# Patient Record
Sex: MALE | Race: WHITE
[De-identification: names, ages, dates, MRNs, and addresses within clinical notes are randomized per-mention and may not be internally consistent; named-entity substitution may affect disease eponyms.]

---

## 2017-01-08 ENCOUNTER — HOSPITAL ENCOUNTER (EMERGENCY)
Age: 72
Discharge: HOME | End: 2017-01-08
Payer: MEDICARE

## 2017-01-08 ENCOUNTER — HOSPITAL ENCOUNTER (OUTPATIENT)
Age: 72
Discharge: ANOTHER HOSPITAL | End: 2017-01-08
Payer: MEDICARE

## 2017-01-08 DIAGNOSIS — F17.200: ICD-10-CM

## 2017-01-08 DIAGNOSIS — Z66: ICD-10-CM

## 2017-01-08 DIAGNOSIS — I10: ICD-10-CM

## 2017-01-08 DIAGNOSIS — M25.571: Primary | ICD-10-CM

## 2017-01-08 DIAGNOSIS — Z79.82: ICD-10-CM

## 2017-01-08 DIAGNOSIS — E11.9: ICD-10-CM

## 2017-01-08 DIAGNOSIS — M1A.0711: ICD-10-CM

## 2017-01-08 PROCEDURE — 93971 EXTREMITY STUDY: CPT

## 2017-01-08 PROCEDURE — 99283 EMERGENCY DEPT VISIT LOW MDM: CPT

## 2017-01-08 PROCEDURE — 73630 X-RAY EXAM OF FOOT: CPT

## 2017-01-08 PROCEDURE — 73610 X-RAY EXAM OF ANKLE: CPT

## 2017-01-13 ENCOUNTER — HOSPITAL ENCOUNTER (INPATIENT)
Age: 72
LOS: 2 days | Discharge: ANOTHER HOSPITAL | DRG: 812 | End: 2017-01-15
Payer: MEDICARE

## 2017-01-13 ENCOUNTER — HOSPITAL ENCOUNTER (OUTPATIENT)
Age: 72
Discharge: ANOTHER HOSPITAL | End: 2017-01-13
Payer: MEDICARE

## 2017-01-13 DIAGNOSIS — Z79.82: ICD-10-CM

## 2017-01-13 DIAGNOSIS — I10: ICD-10-CM

## 2017-01-13 DIAGNOSIS — I50.9: ICD-10-CM

## 2017-01-13 DIAGNOSIS — E78.5: ICD-10-CM

## 2017-01-13 DIAGNOSIS — F17.210: ICD-10-CM

## 2017-01-13 DIAGNOSIS — D50.0: Primary | ICD-10-CM

## 2017-01-13 DIAGNOSIS — Z66: ICD-10-CM

## 2017-01-13 DIAGNOSIS — L97.919: ICD-10-CM

## 2017-01-13 DIAGNOSIS — I83.019: ICD-10-CM

## 2017-01-13 DIAGNOSIS — E66.01: ICD-10-CM

## 2017-01-13 DIAGNOSIS — I11.0: ICD-10-CM

## 2017-01-13 DIAGNOSIS — K92.2: ICD-10-CM

## 2017-01-13 DIAGNOSIS — E87.1: ICD-10-CM

## 2017-01-13 DIAGNOSIS — I77.9: ICD-10-CM

## 2017-01-13 DIAGNOSIS — J44.9: ICD-10-CM

## 2017-01-13 DIAGNOSIS — N17.9: ICD-10-CM

## 2017-01-13 DIAGNOSIS — I87.2: ICD-10-CM

## 2017-01-13 DIAGNOSIS — E11.9: ICD-10-CM

## 2017-01-13 DIAGNOSIS — M10.9: ICD-10-CM

## 2017-01-13 DIAGNOSIS — F32.9: ICD-10-CM

## 2017-01-13 PROCEDURE — 30233N1 TRANSFUSION OF NONAUTOLOGOUS RED BLOOD CELLS INTO PERIPHERAL VEIN, PERCUTANEOUS APPROACH: ICD-10-PCS | Performed by: NURSE PRACTITIONER

## 2017-01-14 PROCEDURE — 30233N1 TRANSFUSION OF NONAUTOLOGOUS RED BLOOD CELLS INTO PERIPHERAL VEIN, PERCUTANEOUS APPROACH: ICD-10-PCS | Performed by: NURSE PRACTITIONER

## 2017-01-22 ENCOUNTER — HOSPITAL ENCOUNTER (OUTPATIENT)
Age: 72
Discharge: ANOTHER HOSPITAL | End: 2017-01-22
Payer: MEDICARE

## 2021-04-14 ENCOUNTER — HOSPITAL ENCOUNTER (OUTPATIENT)
Dept: HOSPITAL 76 - LAB.R | Age: 76
Discharge: HOME | End: 2021-04-14
Attending: SKILLED NURSING FACILITY
Payer: MEDICARE

## 2021-04-14 DIAGNOSIS — F32.9: ICD-10-CM

## 2021-04-14 DIAGNOSIS — D50.8: ICD-10-CM

## 2021-04-14 DIAGNOSIS — I10: Primary | ICD-10-CM

## 2021-04-14 DIAGNOSIS — E78.49: ICD-10-CM

## 2021-04-14 LAB
ANION GAP SERPL CALCULATED.4IONS-SCNC: 10 MMOL/L (ref 6–13)
BASOPHILS NFR BLD AUTO: 0.1 10^3/UL (ref 0–0.1)
BASOPHILS NFR BLD AUTO: 0.9 %
BUN SERPL-MCNC: 14 MG/DL (ref 6–20)
CALCIUM UR-MCNC: 9.7 MG/DL (ref 8.5–10.3)
CHLORIDE SERPL-SCNC: 104 MMOL/L (ref 101–111)
CO2 SERPL-SCNC: 29 MMOL/L (ref 21–32)
CREAT SERPLBLD-SCNC: 0.7 MG/DL (ref 0.6–1.2)
EOSINOPHIL # BLD AUTO: 0.3 10^3/UL (ref 0–0.7)
EOSINOPHIL NFR BLD AUTO: 6 %
ERYTHROCYTE [DISTWIDTH] IN BLOOD BY AUTOMATED COUNT: 13.9 % (ref 12–15)
GFRSERPLBLD MDRD-ARVRAT: 110 ML/MIN/{1.73_M2} (ref 89–?)
GLUCOSE SERPL-MCNC: 101 MG/DL (ref 70–100)
HCT VFR BLD AUTO: 46.6 % (ref 42–52)
HGB UR QL STRIP: 15 G/DL (ref 14–18)
LYMPHOCYTES # SPEC AUTO: 1.7 10^3/UL (ref 1.5–3.5)
LYMPHOCYTES NFR BLD AUTO: 31.8 %
MCH RBC QN AUTO: 30.7 PG (ref 27–31)
MCHC RBC AUTO-ENTMCNC: 32.2 G/DL (ref 32–36)
MCV RBC AUTO: 95.5 FL (ref 80–94)
MONOCYTES # BLD AUTO: 0.4 10^3/UL (ref 0–1)
MONOCYTES NFR BLD AUTO: 8 %
NEUTROPHILS # BLD AUTO: 2.8 10^3/UL (ref 1.5–6.6)
NEUTROPHILS # SNV AUTO: 5.4 X10^3/UL (ref 4.8–10.8)
NEUTROPHILS NFR BLD AUTO: 53.1 %
NRBC # BLD AUTO: 0 /100WBC
NRBC # BLD AUTO: 0 X10^3/UL
PDW BLD AUTO: 12.2 FL (ref 7.4–11.4)
PLATELET # BLD: 251 10^3/UL (ref 130–450)
POTASSIUM SERPL-SCNC: 3.3 MMOL/L (ref 3.5–5)
RBC MAR: 4.88 10^6/UL (ref 4.7–6.1)
SODIUM SERPLBLD-SCNC: 143 MMOL/L (ref 135–145)

## 2021-04-14 PROCEDURE — 80048 BASIC METABOLIC PNL TOTAL CA: CPT

## 2021-04-14 PROCEDURE — 85025 COMPLETE CBC W/AUTO DIFF WBC: CPT

## 2021-05-21 ENCOUNTER — HOSPITAL ENCOUNTER (OUTPATIENT)
Dept: HOSPITAL 76 - LAB.R | Age: 76
Discharge: HOME | End: 2021-05-21
Attending: FAMILY MEDICINE
Payer: MEDICARE

## 2021-05-21 DIAGNOSIS — E87.6: ICD-10-CM

## 2021-05-21 DIAGNOSIS — D50.8: ICD-10-CM

## 2021-05-21 DIAGNOSIS — E78.49: Primary | ICD-10-CM

## 2021-05-21 LAB
ANION GAP SERPL CALCULATED.4IONS-SCNC: 10 MMOL/L (ref 6–13)
BUN SERPL-MCNC: 23 MG/DL (ref 6–20)
CALCIUM UR-MCNC: 9.2 MG/DL (ref 8.5–10.3)
CHLORIDE SERPL-SCNC: 101 MMOL/L (ref 101–111)
CO2 SERPL-SCNC: 31 MMOL/L (ref 21–32)
CREAT SERPLBLD-SCNC: 0.7 MG/DL (ref 0.6–1.2)
GFRSERPLBLD MDRD-ARVRAT: 110 ML/MIN/{1.73_M2} (ref 89–?)
GLUCOSE SERPL-MCNC: 106 MG/DL (ref 70–100)
POTASSIUM SERPL-SCNC: 3.5 MMOL/L (ref 3.5–5)
SODIUM SERPLBLD-SCNC: 142 MMOL/L (ref 135–145)

## 2021-05-21 PROCEDURE — 80048 BASIC METABOLIC PNL TOTAL CA: CPT

## 2021-09-23 ENCOUNTER — HOSPITAL ENCOUNTER (OUTPATIENT)
Dept: HOSPITAL 76 - LAB.R | Age: 76
End: 2021-09-23
Attending: FAMILY MEDICINE
Payer: MEDICARE

## 2021-09-23 DIAGNOSIS — R56.9: ICD-10-CM

## 2021-09-23 DIAGNOSIS — E78.49: ICD-10-CM

## 2021-09-23 DIAGNOSIS — D50.8: ICD-10-CM

## 2021-09-23 DIAGNOSIS — I10: ICD-10-CM

## 2021-09-23 DIAGNOSIS — E87.6: Primary | ICD-10-CM

## 2021-09-23 DIAGNOSIS — N12: ICD-10-CM

## 2021-09-23 LAB
ALBUMIN DIAFP-MCNC: 3.9 G/DL (ref 3.2–5.5)
ALBUMIN/GLOB SERPL: 1.4 {RATIO} (ref 1–2.2)
ALP SERPL-CCNC: 52 IU/L (ref 42–121)
ALT SERPL W P-5'-P-CCNC: 15 IU/L (ref 10–60)
ANION GAP SERPL CALCULATED.4IONS-SCNC: 7 MMOL/L (ref 6–13)
AST SERPL W P-5'-P-CCNC: 11 IU/L (ref 10–42)
BASOPHILS NFR BLD AUTO: 0.1 10^3/UL (ref 0–0.1)
BASOPHILS NFR BLD AUTO: 1.1 %
BILIRUB BLD-MCNC: 0.4 MG/DL (ref 0.2–1)
BUN SERPL-MCNC: 18 MG/DL (ref 6–20)
CALCIUM UR-MCNC: 8.6 MG/DL (ref 8.5–10.3)
CHLORIDE SERPL-SCNC: 102 MMOL/L (ref 101–111)
CLARITY UR REFRACT.AUTO: CLEAR
CO2 SERPL-SCNC: 29 MMOL/L (ref 21–32)
CREAT SERPLBLD-SCNC: 0.8 MG/DL (ref 0.6–1.2)
EOSINOPHIL # BLD AUTO: 0.4 10^3/UL (ref 0–0.7)
EOSINOPHIL NFR BLD AUTO: 7.8 %
ERYTHROCYTE [DISTWIDTH] IN BLOOD BY AUTOMATED COUNT: 13.5 % (ref 12–15)
GFRSERPLBLD MDRD-ARVRAT: 94 ML/MIN/{1.73_M2} (ref 89–?)
GLOBULIN SER-MCNC: 2.7 G/DL (ref 2.1–4.2)
GLUCOSE SERPL-MCNC: 119 MG/DL (ref 70–100)
GLUCOSE UR QL STRIP.AUTO: NEGATIVE MG/DL
HCT VFR BLD AUTO: 43.5 % (ref 42–52)
HGB UR QL STRIP: 13.8 G/DL (ref 14–18)
KETONES UR QL STRIP.AUTO: NEGATIVE MG/DL
LYMPHOCYTES # SPEC AUTO: 1.6 10^3/UL (ref 1.5–3.5)
LYMPHOCYTES NFR BLD AUTO: 28.9 %
MCH RBC QN AUTO: 30.1 PG (ref 27–31)
MCHC RBC AUTO-ENTMCNC: 31.7 G/DL (ref 32–36)
MCV RBC AUTO: 94.8 FL (ref 80–94)
MONOCYTES # BLD AUTO: 0.5 10^3/UL (ref 0–1)
MONOCYTES NFR BLD AUTO: 8.3 %
NEUTROPHILS # BLD AUTO: 3 10^3/UL (ref 1.5–6.6)
NEUTROPHILS # SNV AUTO: 5.6 X10^3/UL (ref 4.8–10.8)
NEUTROPHILS NFR BLD AUTO: 53.7 %
NITRITE UR QL STRIP.AUTO: NEGATIVE
NRBC # BLD AUTO: 0 /100WBC
NRBC # BLD AUTO: 0 X10^3/UL
PDW BLD AUTO: 11.8 FL (ref 7.4–11.4)
PH UR STRIP.AUTO: 5 PH (ref 5–7.5)
PLATELET # BLD: 223 10^3/UL (ref 130–450)
POTASSIUM SERPL-SCNC: 3.7 MMOL/L (ref 3.5–5)
PROT SPEC-MCNC: 6.6 G/DL (ref 6.7–8.2)
PROT UR STRIP.AUTO-MCNC: NEGATIVE MG/DL
RBC # UR STRIP.AUTO: NEGATIVE /UL
RBC MAR: 4.59 10^6/UL (ref 4.7–6.1)
SODIUM SERPLBLD-SCNC: 138 MMOL/L (ref 135–145)
SP GR UR STRIP.AUTO: 1.02 (ref 1–1.03)
UROBILINOGEN UR QL STRIP.AUTO: (no result) E.U./DL
UROBILINOGEN UR STRIP.AUTO-MCNC: NEGATIVE MG/DL

## 2021-09-23 PROCEDURE — 87086 URINE CULTURE/COLONY COUNT: CPT

## 2021-09-23 PROCEDURE — 85025 COMPLETE CBC W/AUTO DIFF WBC: CPT

## 2021-09-23 PROCEDURE — 81003 URINALYSIS AUTO W/O SCOPE: CPT

## 2021-09-23 PROCEDURE — 80053 COMPREHEN METABOLIC PANEL: CPT

## 2021-09-23 PROCEDURE — 81001 URINALYSIS AUTO W/SCOPE: CPT

## 2022-07-16 ENCOUNTER — HOSPITAL ENCOUNTER (OUTPATIENT)
Dept: HOSPITAL 76 - EMS | Age: 77
Discharge: ANOTHER HOSPITAL | End: 2022-07-16
Payer: MEDICARE

## 2022-07-16 ENCOUNTER — HOSPITAL ENCOUNTER (INPATIENT)
Dept: HOSPITAL 76 - ED | Age: 77
LOS: 2 days | Discharge: NURSING, REHAB OR LONG-TERM CARE | DRG: 69 | End: 2022-07-18
Attending: INTERNAL MEDICINE | Admitting: INTERNAL MEDICINE
Payer: MEDICARE

## 2022-07-16 DIAGNOSIS — Z89.512: ICD-10-CM

## 2022-07-16 DIAGNOSIS — G45.9: ICD-10-CM

## 2022-07-16 DIAGNOSIS — R53.1: ICD-10-CM

## 2022-07-16 DIAGNOSIS — Z89.511: ICD-10-CM

## 2022-07-16 DIAGNOSIS — R40.4: ICD-10-CM

## 2022-07-16 DIAGNOSIS — Z74.01: ICD-10-CM

## 2022-07-16 DIAGNOSIS — Z88.8: ICD-10-CM

## 2022-07-16 DIAGNOSIS — E78.1: ICD-10-CM

## 2022-07-16 DIAGNOSIS — Z66: ICD-10-CM

## 2022-07-16 DIAGNOSIS — G83.24: ICD-10-CM

## 2022-07-16 DIAGNOSIS — I69.351: ICD-10-CM

## 2022-07-16 DIAGNOSIS — E11.40: ICD-10-CM

## 2022-07-16 DIAGNOSIS — I10: ICD-10-CM

## 2022-07-16 DIAGNOSIS — R29.810: Primary | ICD-10-CM

## 2022-07-16 DIAGNOSIS — F32.A: ICD-10-CM

## 2022-07-16 DIAGNOSIS — Z79.82: ICD-10-CM

## 2022-07-16 DIAGNOSIS — E78.5: ICD-10-CM

## 2022-07-16 DIAGNOSIS — E11.9: ICD-10-CM

## 2022-07-16 DIAGNOSIS — Z79.899: ICD-10-CM

## 2022-07-16 DIAGNOSIS — I44.7: ICD-10-CM

## 2022-07-16 DIAGNOSIS — R29.810: ICD-10-CM

## 2022-07-16 DIAGNOSIS — F17.200: ICD-10-CM

## 2022-07-16 DIAGNOSIS — R41.82: Primary | ICD-10-CM

## 2022-07-16 DIAGNOSIS — E66.01: ICD-10-CM

## 2022-07-16 DIAGNOSIS — Z20.822: ICD-10-CM

## 2022-07-16 DIAGNOSIS — E78.00: ICD-10-CM

## 2022-07-16 LAB
ALBUMIN DIAFP-MCNC: 3.7 G/DL (ref 3.2–5.5)
ALBUMIN/GLOB SERPL: 1 {RATIO} (ref 1–2.2)
ALP SERPL-CCNC: 63 IU/L (ref 42–121)
ALT SERPL W P-5'-P-CCNC: 21 IU/L (ref 10–60)
ANION GAP SERPL CALCULATED.4IONS-SCNC: 14 MMOL/L (ref 6–13)
AST SERPL W P-5'-P-CCNC: 15 IU/L (ref 10–42)
BASOPHILS NFR BLD AUTO: 0.1 10^3/UL (ref 0–0.1)
BASOPHILS NFR BLD AUTO: 0.6 %
BILIRUB BLD-MCNC: 0.6 MG/DL (ref 0.2–1)
BUN SERPL-MCNC: 51 MG/DL (ref 6–20)
CALCIUM UR-MCNC: 9.8 MG/DL (ref 8.5–10.3)
CHLORIDE SERPL-SCNC: 107 MMOL/L (ref 101–111)
CLARITY UR REFRACT.AUTO: CLEAR
CO2 SERPL-SCNC: 25 MMOL/L (ref 21–32)
CREAT SERPLBLD-SCNC: 1.9 MG/DL (ref 0.6–1.2)
EOSINOPHIL # BLD AUTO: 0.8 10^3/UL (ref 0–0.7)
EOSINOPHIL NFR BLD AUTO: 8.7 %
ERYTHROCYTE [DISTWIDTH] IN BLOOD BY AUTOMATED COUNT: 13.5 % (ref 12–15)
GFRSERPLBLD MDRD-ARVRAT: 35 ML/MIN/{1.73_M2} (ref 89–?)
GLOBULIN SER-MCNC: 3.8 G/DL (ref 2.1–4.2)
GLUCOSE SERPL-MCNC: 108 MG/DL (ref 70–100)
GLUCOSE UR QL STRIP.AUTO: NEGATIVE MG/DL
HCT VFR BLD AUTO: 44.6 % (ref 42–52)
HGB UR QL STRIP: 13.9 G/DL (ref 14–18)
INR PPP: 1.1 (ref 0.8–1.2)
KETONES UR QL STRIP.AUTO: NEGATIVE MG/DL
LIPASE SERPL-CCNC: 24 U/L (ref 22–51)
LYMPHOCYTES # SPEC AUTO: 1.9 10^3/UL (ref 1.5–3.5)
LYMPHOCYTES NFR BLD AUTO: 20.4 %
MAGNESIUM SERPL-MCNC: 2.1 MG/DL (ref 1.7–2.8)
MCH RBC QN AUTO: 29.7 PG (ref 27–31)
MCHC RBC AUTO-ENTMCNC: 31.2 G/DL (ref 32–36)
MCV RBC AUTO: 95.3 FL (ref 80–94)
MONOCYTES # BLD AUTO: 0.7 10^3/UL (ref 0–1)
MONOCYTES NFR BLD AUTO: 6.8 %
NEUTROPHILS # BLD AUTO: 5.9 10^3/UL (ref 1.5–6.6)
NEUTROPHILS # SNV AUTO: 9.5 X10^3/UL (ref 4.8–10.8)
NEUTROPHILS NFR BLD AUTO: 62.8 %
NITRITE UR QL STRIP.AUTO: NEGATIVE
NRBC # BLD AUTO: 0 /100WBC
NRBC # BLD AUTO: 0 X10^3/UL
PDW BLD AUTO: 11.1 FL (ref 7.4–11.4)
PH UR STRIP.AUTO: 5.5 PH (ref 5–7.5)
PLATELET # BLD: 305 10^3/UL (ref 130–450)
POTASSIUM SERPL-SCNC: 5 MMOL/L (ref 3.5–5)
PROT SPEC-MCNC: 7.5 G/DL (ref 6.7–8.2)
PROT UR STRIP.AUTO-MCNC: NEGATIVE MG/DL
PROTHROM ACT/NOR PPP: 12.6 SECS (ref 9.9–12.6)
RBC # UR STRIP.AUTO: NEGATIVE /UL
RBC MAR: 4.68 10^6/UL (ref 4.7–6.1)
SODIUM SERPLBLD-SCNC: 146 MMOL/L (ref 135–145)
SP GR UR STRIP.AUTO: 1.02 (ref 1–1.03)
UROBILINOGEN UR QL STRIP.AUTO: (no result) E.U./DL
UROBILINOGEN UR STRIP.AUTO-MCNC: NEGATIVE MG/DL

## 2022-07-16 PROCEDURE — 81001 URINALYSIS AUTO W/SCOPE: CPT

## 2022-07-16 PROCEDURE — 70450 CT HEAD/BRAIN W/O DYE: CPT

## 2022-07-16 PROCEDURE — 80048 BASIC METABOLIC PNL TOTAL CA: CPT

## 2022-07-16 PROCEDURE — 83605 ASSAY OF LACTIC ACID: CPT

## 2022-07-16 PROCEDURE — 87635 SARS-COV-2 COVID-19 AMP PRB: CPT

## 2022-07-16 PROCEDURE — 36415 COLL VENOUS BLD VENIPUNCTURE: CPT

## 2022-07-16 PROCEDURE — 83880 ASSAY OF NATRIURETIC PEPTIDE: CPT

## 2022-07-16 PROCEDURE — 87086 URINE CULTURE/COLONY COUNT: CPT

## 2022-07-16 PROCEDURE — 81003 URINALYSIS AUTO W/O SCOPE: CPT

## 2022-07-16 PROCEDURE — 51701 INSERT BLADDER CATHETER: CPT

## 2022-07-16 PROCEDURE — 83036 HEMOGLOBIN GLYCOSYLATED A1C: CPT

## 2022-07-16 PROCEDURE — 86900 BLOOD TYPING SEROLOGIC ABO: CPT

## 2022-07-16 PROCEDURE — 99285 EMERGENCY DEPT VISIT HI MDM: CPT

## 2022-07-16 PROCEDURE — 97165 OT EVAL LOW COMPLEX 30 MIN: CPT

## 2022-07-16 PROCEDURE — 93005 ELECTROCARDIOGRAM TRACING: CPT

## 2022-07-16 PROCEDURE — 86901 BLOOD TYPING SEROLOGIC RH(D): CPT

## 2022-07-16 PROCEDURE — 84484 ASSAY OF TROPONIN QUANT: CPT

## 2022-07-16 PROCEDURE — 97161 PT EVAL LOW COMPLEX 20 MIN: CPT

## 2022-07-16 PROCEDURE — 83721 ASSAY OF BLOOD LIPOPROTEIN: CPT

## 2022-07-16 PROCEDURE — 83690 ASSAY OF LIPASE: CPT

## 2022-07-16 PROCEDURE — 85025 COMPLETE CBC W/AUTO DIFF WBC: CPT

## 2022-07-16 PROCEDURE — 80053 COMPREHEN METABOLIC PANEL: CPT

## 2022-07-16 PROCEDURE — 71045 X-RAY EXAM CHEST 1 VIEW: CPT

## 2022-07-16 PROCEDURE — 83735 ASSAY OF MAGNESIUM: CPT

## 2022-07-16 PROCEDURE — 85610 PROTHROMBIN TIME: CPT

## 2022-07-16 PROCEDURE — 80061 LIPID PANEL: CPT

## 2022-07-16 PROCEDURE — 86850 RBC ANTIBODY SCREEN: CPT

## 2022-07-16 RX ADMIN — DEXTROSE AND SODIUM CHLORIDE SCH MLS/HR: 5; 450 INJECTION, SOLUTION INTRAVENOUS at 20:23

## 2022-07-16 NOTE — ED PHYSICIAN DOCUMENTATION
PD HPI ALTERED MENTAL STATUS





- Stated complaint


Stated Complaint: AMS





- Chief complaint


Chief Complaint: Neuro





- History obtained from


History obtained from: EMS





- Additional information


Additional information: 


Patient is a 77-year-old male with a previous stroke and bilateral BKA 

presenting for evaluation of altered mental status, possible code stroke.  He 

was last seen at his normal around 9:00 this morning.Sometime thereafter, staff 

noted that he was not acting at his baseline and appeared to have new weakness. 

He normally will answer questions but nonsensically.  He does have hemiparesis 

to the right.  He was noted to have left-sided facial droop and new left arm 

weakness.Per EMS, blood sugar was 108. Patient resides at [facility] in [place].

 History is limited. He is not reportedly on a blood thinner.  He reportedly 

recently finished a course of antibiotics - EMS was told he was on it for gout.








Review of Systems


Unable to obtain: AMS





PD PAST MEDICAL HISTORY





- Past Medical History


Cardiovascular: Hypertension, High cholesterol


Respiratory: Pneumonia


Endocrine/Autoimmune: Type 2 diabetes


GI: None


: None


HEENT: None


Psych: None


Musculoskeletal: None


Derm: None





- Past Surgical History


Past Surgical History: Yes


General: Appendectomy


Ortho: Other


HEENT: Tonsil/Adenoidectomy





- Present Medications


Home Medications: 


                                Ambulatory Orders











 Medication  Instructions  Recorded  Confirmed


 


Citalopram [CeleXA] 20 mg PO QPM 02/05/16 01/13/17


 


LORazepam [Lorazepam] 0.5 mg PO DAILY PRN 02/05/16 01/13/17


 


Metoprolol Tartrate 50 mg PO DAILY 02/05/16 01/13/17


 


Solifenacin Succinate [Vesicare] 10 mg PO QPM 02/05/16 01/13/17


 


lisinopriL [Lisinopril] 40 mg PO DAILY 02/05/16 01/13/17


 


Acetaminophen [Tylenol Extra 500 mg PO QID PRN 12/14/16 01/13/17





Strength]   


 


Aspirin [Aspirin EC] 81 mg PO DAILY 12/14/16 01/13/17


 


Atorvastatin [Lipitor] 20 mg PO DAILY 12/14/16 01/13/17


 


Colchicine 0.6 mg PO TID PRN 12/14/16 01/13/17


 


Furosemide [Lasix] 60 mg PO DAILY 12/14/16 01/13/17


 


Omeprazole [PriLOSEC] 20 mg PO BID 12/14/16 01/13/17


 


Potassium Chloride 10 meq PO DAILY 12/14/16 01/13/17


 


allopurinoL [Allopurinol] 300 mg PO DAILY 12/14/16 01/13/17


 


Furosemide 60 mg PO DAILY@1400 01/13/17 01/13/17


 


Hydrocodone/Acetaminophen 1 each PO BID 01/13/17 01/13/17





[Hydrocodon-Acetaminophen 5-325]   


 


cloNIDine HCL [Clonidine HCl] 0.1 mg PO BID 01/13/17 01/13/17


 


metOLazone [Metolazone] 5 mg PO 0700,1200 01/13/17 01/13/17


 


oxyCODONE [Roxicodone] 5 mg PO Q4HR PRN 01/13/17 01/13/17














- Allergies


Allergies/Adverse Reactions: 


                                    Allergies











Allergy/AdvReac Type Severity Reaction Status Date / Time


 


gabapentin Allergy  Unknown Verified 07/16/22 10:26














- Social History


Does the pt smoke?: Yes


Smoking Status: Current every day smoker


Does the pt drink ETOH?: No


Does the pt have substance abuse?: No





- Immunizations


Immunizations are current?: Yes





- POLST


Patient has POLST: No





PD ED PE NORMAL





- General


General: Well developed/nourished, Other (No acute distress, appears older than 

stated age, opens eyes to verbal but not following commands or answering 

questions)





- HEENT


HEENT: Atraumatic, PERRL.  No: Moist mucous membranes (Dry mucous membranes)





- Neck


Neck: No bony TTP





- Cardiac


Cardiac: RRR, No murmur, Strong equal pulses





- Respiratory


Respiratory: No respiratory distress, Other (Diminished breath sounds 

throughout)





- Abdomen


Abdomen: Normal bowel sounds, Soft, Non tender, Non distended





- Extremities


Extremities: Other (Bilateral BKA)





- Neuro


Neuro: Other (Alert, mumbles, not following commands, Weak  To left, Not 

able to raise any extremities off the bed, )





Results





- Vitals


Vitals: 


                               Vital Signs - 24 hr











  07/16/22 07/16/22 07/16/22





  09:59 10:32 10:49


 


Temperature 36.3 C L  


 


Heart Rate 75 71 63


 


Respiratory 17 16 15





Rate   


 


Blood Pressure 159/79 H 148/82 H 148/82 H


 


O2 Saturation 92 95 95














  07/16/22 07/16/22 07/16/22





  12:14 12:30 13:00


 


Temperature 36.5 C 36.5 C 


 


Heart Rate 64 67 65


 


Respiratory 14 12 12





Rate   


 


Blood Pressure 124/61 123/84 H 120/80


 


O2 Saturation 96 96 96














  07/16/22 07/16/22 07/16/22





  13:30 14:00 14:30


 


Temperature   


 


Heart Rate 71 66 62


 


Respiratory 14 14 14





Rate   


 


Blood Pressure 169/77 H 160/69 H 132/59 H


 


O2 Saturation 99 96 94














  07/16/22 07/16/22 07/16/22





  15:00 15:38 16:00


 


Temperature   


 


Heart Rate 70 67 67


 


Respiratory 20 17 16





Rate   


 


Blood Pressure 179/76 H 179/99 H 179/99 H


 


O2 Saturation 96 96 96














  07/16/22 07/16/22 07/16/22





  16:30 17:00 17:30


 


Temperature 36.5 C 36.5 C 


 


Heart Rate 65 71 70


 


Respiratory 16 14 22





Rate   


 


Blood Pressure 180/80 H 180/70 H 180/85 H


 


O2 Saturation 95 96 95














  07/16/22 07/16/22 07/16/22





  18:00 18:49 19:00


 


Temperature   


 


Heart Rate 78 76 73


 


Respiratory 24 18 18





Rate   


 


Blood Pressure 180/80 H 178/84 H 180/70 H


 


O2 Saturation 97 94 95








                                     Oxygen











O2 Source                      Room air


 


Oxygen Flow Rate               2

















- EKG (time done)


  ** 1048


Rate: Rate (enter#) (62)


Rhythm: NSR


Intervals: LBBB


Ischemia: No: ST elevation c/w ischemia, ST depression, T wave inversion





- Labs


Labs: 


                                Laboratory Tests











  07/16/22 07/16/22 07/16/22





  10:08 10:08 10:08


 


WBC  9.5  


 


RBC  4.68 L  


 


Hgb  13.9 L  


 


Hct  44.6  


 


MCV  95.3 H  


 


MCH  29.7  


 


MCHC  31.2 L  


 


RDW  13.5  


 


Plt Count  305  


 


MPV  11.1  


 


Neut # (Auto)  5.9  


 


Lymph # (Auto)  1.9  


 


Mono # (Auto)  0.7  


 


Eos # (Auto)  0.8 H  


 


Baso # (Auto)  0.1  


 


Absolute Nucleated RBC  0.00  


 


Nucleated RBC %  0.0  


 


PT   12.6 


 


INR   1.1 


 


Sodium    146 H


 


Potassium    5.0


 


Chloride    107


 


Carbon Dioxide    25


 


Anion Gap    14.0 H


 


BUN    51 H


 


Creatinine    1.9 H


 


Estimated GFR (MDRD)    35 L


 


Glucose    108 H


 


Lactic Acid   


 


Calcium    9.8


 


Magnesium    2.1


 


Total Bilirubin    0.6


 


AST    15


 


ALT    21


 


Alkaline Phosphatase    63


 


Troponin I High Sens   


 


B-Natriuretic Peptide   


 


Total Protein    7.5


 


Albumin    3.7


 


Globulin    3.8


 


Albumin/Globulin Ratio    1.0


 


Lipase    24


 


Urine Color   


 


Urine Clarity   


 


Urine pH   


 


Ur Specific Gravity   


 


Urine Protein   


 


Urine Glucose (UA)   


 


Urine Ketones   


 


Urine Occult Blood   


 


Urine Nitrite   


 


Urine Bilirubin   


 


Urine Urobilinogen   


 


Ur Leukocyte Esterase   


 


Ur Microscopic Review   


 


Urine Culture Comments   


 


SARS-CoV-2 (PCR)   


 


Blood Type   


 


Antibody Screen   














  07/16/22 07/16/22 07/16/22





  10:08 10:08 10:40


 


WBC   


 


RBC   


 


Hgb   


 


Hct   


 


MCV   


 


MCH   


 


MCHC   


 


RDW   


 


Plt Count   


 


MPV   


 


Neut # (Auto)   


 


Lymph # (Auto)   


 


Mono # (Auto)   


 


Eos # (Auto)   


 


Baso # (Auto)   


 


Absolute Nucleated RBC   


 


Nucleated RBC %   


 


PT   


 


INR   


 


Sodium   


 


Potassium   


 


Chloride   


 


Carbon Dioxide   


 


Anion Gap   


 


BUN   


 


Creatinine   


 


Estimated GFR (MDRD)   


 


Glucose   


 


Lactic Acid    1.0


 


Calcium   


 


Magnesium   


 


Total Bilirubin   


 


AST   


 


ALT   


 


Alkaline Phosphatase   


 


Troponin I High Sens  7.8  


 


B-Natriuretic Peptide   12 


 


Total Protein   


 


Albumin   


 


Globulin   


 


Albumin/Globulin Ratio   


 


Lipase   


 


Urine Color   


 


Urine Clarity   


 


Urine pH   


 


Ur Specific Gravity   


 


Urine Protein   


 


Urine Glucose (UA)   


 


Urine Ketones   


 


Urine Occult Blood   


 


Urine Nitrite   


 


Urine Bilirubin   


 


Urine Urobilinogen   


 


Ur Leukocyte Esterase   


 


Ur Microscopic Review   


 


Urine Culture Comments   


 


SARS-CoV-2 (PCR)   


 


Blood Type   


 


Antibody Screen   














  07/16/22 07/16/22 07/16/22





  10:40 10:49 13:00


 


WBC   


 


RBC   


 


Hgb   


 


Hct   


 


MCV   


 


MCH   


 


MCHC   


 


RDW   


 


Plt Count   


 


MPV   


 


Neut # (Auto)   


 


Lymph # (Auto)   


 


Mono # (Auto)   


 


Eos # (Auto)   


 


Baso # (Auto)   


 


Absolute Nucleated RBC   


 


Nucleated RBC %   


 


PT   


 


INR   


 


Sodium   


 


Potassium   


 


Chloride   


 


Carbon Dioxide   


 


Anion Gap   


 


BUN   


 


Creatinine   


 


Estimated GFR (MDRD)   


 


Glucose   


 


Lactic Acid   


 


Calcium   


 


Magnesium   


 


Total Bilirubin   


 


AST   


 


ALT   


 


Alkaline Phosphatase   


 


Troponin I High Sens   


 


B-Natriuretic Peptide   


 


Total Protein   


 


Albumin   


 


Globulin   


 


Albumin/Globulin Ratio   


 


Lipase   


 


Urine Color    YELLOW


 


Urine Clarity    CLEAR


 


Urine pH    5.5


 


Ur Specific Gravity    1.020


 


Urine Protein    NEGATIVE


 


Urine Glucose (UA)    NEGATIVE


 


Urine Ketones    NEGATIVE


 


Urine Occult Blood    NEGATIVE


 


Urine Nitrite    NEGATIVE


 


Urine Bilirubin    NEGATIVE


 


Urine Urobilinogen    0.2 (NORMAL)


 


Ur Leukocyte Esterase    NEGATIVE


 


Ur Microscopic Review    NOT INDICATED


 


Urine Culture Comments    NOT INDICATED


 


SARS-CoV-2 (PCR)   NOT DETECTED 


 


Blood Type  O POSITIVE  


 


Antibody Screen  NEGATIVE  














PD MEDICAL DECISION MAKING





- ED course


Complexity details: re-evaluated patient


ED course: 





Patient with right-sided hemiparesis and bilateral BKA's presenting with new 

onset left upper extremity weakness and altered mental status.EMS report 

indicated that last known normal was this morning so code stroke was initiated. 

 Patient was not a tPA candidate given large distribution of previous stroke and

 not a candidate for neuro intervention based on baseline immobility. He does 

not appear to be septic. Concern for acute CVA given new weakness of LUE.





1057 - Per Dr. Flores (telestroke) - Patient would not be a tPA candidate given 

Large previous stroke as seen on CT brain.  He recommends holding on CT angios 

head and neck at this time as he would not be candidate For neuro intervention 

given his baseline bedbound status with already known hemiplegia.He recommends 

continuing aspirin.  He recommends obtaining an MRI when available, aware that 

MRI is not available at this facility until Monday.





Patient admitted to hospitalist service [facility] with concerns for acute CVA.





1457 - Patient does not have admit orders yet.  Patient with stable vital signs.

  Opens eyes to verbal.  No other change in exam.








Departure





- Departure


Disposition: [facility] DC/Xfer


Clinical Impression: 


 Stroke-like symptoms





Altered mental status


Qualifiers:


 Altered mental status type: unspecified Qualified Code(s): R41.82 - Altered 

mental status, unspecified





Condition: Serious


Discharge Date/Time: 07/16/22 19:48

## 2022-07-16 NOTE — CT REPORT
PROCEDURE:  CT brain without contrast

 

INDICATIONS:  Code stroke/altered/R sided weakness

 

TECHNIQUE:  

Noncontrast 4.5 mm thick angled axial sections acquired from the foramen magnum to the vertex.  For r
adiation dose reduction, the following was used:  automated exposure control, adjustment of mA and/or
 kV according to patient size.

 

COMPARISON:  None.

 

FINDINGS:  

Image quality:  Excellent.  

 

CSF spaces:  Basal cisterns are patent.  No extra-axial fluid collections.  Ventricles are normal in 
size and shape.  

 

Brain: Cystic encephalomalacia and gliosis noted throughout the left MCA territory. Diffuse atrophy a
nd chronic ischemic change noted as well. There is dense atherosclerotic vascular calcification invol
ving the intradural vertebral arteries, particularly on the left as well as the cavernous segments of
 both internal carotid arteries. No acute hemorrhage or mass effect.

 

Skull and face:  Calvarium and visualized facial bones are intact, without suspicious lesions.  

 

Sinuses: Air-fluid level noted in the left maxillary sinus 

 

IMPRESSION:  

1. Atrophy and chronic ischemic change without acute hemorrhage or mass effect.

2. Large old left MCA infarct.

3. Air-fluid level left maxillary sinus could reflect acute sinusitis.

 

 

 

Note: Critical results were discussed with patients physician in the ER at 9:36 AM AK time on 7/16/20
22

 

Reviewed by: Eduard Almaguer MD on 7/16/2022 9:37 AM AKDT

Approved by: Eduard Almaguer MD on 7/16/2022 9:37 AM AKDT

 

 

Station ID:  [id number]

## 2022-07-16 NOTE — HISTORY & PHYSICAL EXAMINATION
Chief Complaint





- Chief Complaint


Chief Complaint: altered mental status





History of Present Illness





- History of Present Illness


HPI Comment/Other: 





"Patient is a 77-year-old male with a previous stroke and bilateral BKA 

presenting for evaluation of altered mental status, possible code stroke.  He 

was last seen at his normal around 9:00 this morning.Sometime thereafter, staff 

noted that he was not acting at his baseline and appeared to have new weakness. 

He normally will answer questions but nonsensically.  He does have hemiparesis 

to the right.  He was noted to have left-sided facial droop and new left arm 

weakness.Per EMS, blood sugar was 108. Patient resides at [facility] in [place].

 History is limited. He is not reportedly on a blood thinner.  He reportedly 

recently finished a course of antibiotics - EMS was told he was on it for gout."





HPI above is obtained from the ED providers H&P Because the patient is unable to

provide a history.  This is likely due to his strokes. At bedside he appears 

comfortable.  He does not respond to questions asked though awake and alert.


Right hemiparesis noted.  Bilateral BKA noted.








History





- Past Medical History


Cardiovascular: reports: Hypertension, High cholesterol


Respiratory: reports: Pneumonia


Endocrine/Autoimmune: reports: Type 2 diabetes


GI: reports: None


: reports: None


HEENT: reports: None


Psych: reports: None


Musculoskeletal: reports: None


Derm: reports: None


MRSA Hx?: No





- Past Surgical History


General: reports: Appendectomy


Ortho: reports: Other (Bilateral BKA, Right Hip Replacement)


HEENT: reports: Tonsil/Adenoidectomy





- Family & Social History


Family History Comment/Other: Previous records indicate patient's father passed 

away from an MVA at age 54.  His mother from cardiac disease at age 73.  He has 

1 brother and 2 sisters.  1  from unknown type of cancer.


Social History Notes: Patient currently resides at [facility].  In the 

past he smoked 1 pack of cigarettes daily for at least 50 years.  He quit drinki

ng alcohol a long time ago but drank heavily for 20 to 25 years.





- POLST


Patient has POLST: No


POLST Status: DNR





Meds/Allgy





- Home Medications


Home Medications: 


                                Ambulatory Orders











 Medication  Instructions  Recorded  Confirmed


 


Citalopram [CeleXA] 20 mg PO QPM /17


 


LORazepam [Lorazepam] 0.5 mg PO DAILY PRN 16


 


Metoprolol Tartrate 50 mg PO DAILY 16


 


Solifenacin Succinate [Vesicare] 10 mg PO QPM 16


 


lisinopriL [Lisinopril] 40 mg PO DAILY 16


 


Acetaminophen [Tylenol Extra 500 mg PO QID PRN 16





Strength]   


 


Aspirin [Aspirin EC] 81 mg PO DAILY 16


 


Atorvastatin [Lipitor] 20 mg PO DAILY 16


 


Colchicine 0.6 mg PO TID PRN 16


 


Furosemide [Lasix] 60 mg PO DAILY 16


 


Omeprazole [PriLOSEC] 20 mg PO BID 16


 


Potassium Chloride 10 meq PO DAILY 16


 


allopurinoL [Allopurinol] 300 mg PO DAILY 16


 


Furosemide 60 mg PO DAILY@1400 17


 


Hydrocodone/Acetaminophen 1 each PO BID 17





[Hydrocodon-Acetaminophen 5-325]   


 


cloNIDine HCL [Clonidine HCl] 0.1 mg PO BID 17


 


metOLazone [Metolazone] 5 mg PO 0700,1200 17


 


oxyCODONE [Roxicodone] 5 mg PO Q4HR PRN 17














- Allergies


Allergies/Adverse Reactions: 


                                    Allergies











Allergy/AdvReac Type Severity Reaction Status Date / Time


 


gabapentin Allergy  Unknown Verified 22 10:26














Review of Systems





- Other Findings


Other Findings: 





The 12 point review of system is limited because the patient is nonverbal though

 awake and alert.


Prior Level of Functionality: 





Patient is dependent for activities of daily living.  He has bilateral BKA and 

right hemiparesis from CVA.  He resides at [facility].





Exam





- Vital Signs


Vital Signs: 





                                Vital Signs x48h











  Temp Pulse Resp BP Pulse Ox


 


 22 19:00   73  18  180/70 H  95


 


 22 18:49   76  18  178/84 H  94


 


 22 18:00   78  24  180/80 H  97


 


 22 17:30   70  22  180/85 H  95


 


 22 17:00  36.5 C  71  14  180/70 H  96


 


 22 16:30  36.5 C  65  16  180/80 H  95


 


 22 16:00   67  16  179/99 H  96


 


 22 15:38   67  17  179/99 H  96


 


 22 15:00   70  20  179/76 H  96


 


 22 14:30   62  14  132/59 H  94


 


 22 14:00   66  14  160/69 H  96


 


 22 13:30   71  14  169/77 H  99


 


 22 13:00   65  12  120/80  96


 


 22 12:30  36.5 C  67  12  123/84 H  96


 


 22 12:14  36.5 C  64  14  124/61  96














- Physical Exam


General Appearance: positive: No acute distress, Alert


Eyes Bilateral: positive: PERRL, EOMI


ENT: positive: No signs of dehydration


Neck: positive: No JVD, Trachea midline


Respiratory: positive: Chest non-tender, No respiratory distress, Breath sounds 

nml.  negative: Wheezes, Rales, Rhonchi


Cardiovascular: positive: Regular rate & rhythm


Abdomen: positive: Non-tender, No organomegaly, Nml bowel sounds, No distention.

  negative: Guarding, Rebound


Neurologic/Psychiatric: positive: Other (right hemiparesis, not communicative 

though awake and alert)





Conclusion/Plan





- Problem List


(1) Stroke-like symptoms


Conclusion/Plan: 


CT brain without contrast was negative for any acute intracranial process or 

mass-effect.


Atrophy and chronic ischemic changes were noted.  Large old left MCA infarct 

noted.


Per the directions of the teleneurologist tPA was not given and CT angio head 

and neck was not done.


Will obtain an MRI when available.


Neurochecks every shift.  We will check lipid panel and hemoglobin A1c in the 

a.m.


Patient was given 300 mg per rectum aspirin suppository.


Will allow permissive hypertension and treat for systolic blood pressure greater

 than 180 with hydralazine IV.








(2) Hypertension


Conclusion/Plan: 


Patient is normally on metoprolol succinate 25 mg p.o. daily.  We will hold for 

now while allowing permissive hypertension.


Hydralazine 10 mg IV every 4 hours as needed ordered for systolic blood pressure

 greater than 180.








(3) Diabetes mellitus


Conclusion/Plan: 


Not on any medication.


We will check hemoglobin A1c in the morning.


Qualifiers: 


   Diabetes mellitus type: type 2 








(5) Neuropathy


Conclusion/Plan: 


Lyrica 100mg po bid








(6) Depression


Conclusion/Plan: 


Duloxetine 30 mg p.o. daily.








- Lab Results


Fish Bones: 


                                 22 10:08





                                 22 10:08





Core Measures





- Anticipated LOS


I expect patient to be DC'd or transferred within 96 hours.: Yes





- DVT/VTE - Prophylaxis


Not Ordered - Medical Reason: Not tolerated (bilateral bka)

## 2022-07-16 NOTE — XRAY REPORT
PROCEDURE:  Chest 1 View X-Ray

 

INDICATIONS:  AMS

 

TECHNIQUE:  One view of the chest was acquired.  

 

COMPARISON:  None

 

FINDINGS:  

 

Surgical changes and devices:  None.  

 

Lungs and pleura:  No pleural effusions or pneumothorax.  Lungs are clear.  

 

Mediastinum:  Mediastinal contours appear normal.  Heart size is normal. Sclerotic calcification note
d in the aortic arch

 

Bones and chest wall:  No suspicious bony lesions.  Overlying soft tissues appear unremarkable.  Old 
healed right-sided rib fractures

 

IMPRESSION:  

No acute cardiopulmonary findings

 

Reviewed by: Eduard Almaguer MD on 7/16/2022 10:49 AM IHSAN

Approved by: Eduard Almaguer MD on 7/16/2022 10:49 AM IHSAN

 

 

Station ID:  [id number]

## 2022-07-17 LAB
ANION GAP SERPL CALCULATED.4IONS-SCNC: 9 MMOL/L (ref 6–13)
BASOPHILS NFR BLD AUTO: 0.1 10^3/UL (ref 0–0.1)
BASOPHILS NFR BLD AUTO: 0.9 %
BUN SERPL-MCNC: 39 MG/DL (ref 6–20)
CALCIUM UR-MCNC: 9.3 MG/DL (ref 8.5–10.3)
CHLORIDE SERPL-SCNC: 109 MMOL/L (ref 101–111)
CHOLEST SERPL-MCNC: 183 MG/DL
CO2 SERPL-SCNC: 26 MMOL/L (ref 21–32)
CREAT SERPLBLD-SCNC: 1.1 MG/DL (ref 0.6–1.2)
EOSINOPHIL # BLD AUTO: 0.2 10^3/UL (ref 0–0.7)
EOSINOPHIL NFR BLD AUTO: 2.6 %
ERYTHROCYTE [DISTWIDTH] IN BLOOD BY AUTOMATED COUNT: 13.3 % (ref 12–15)
EST. AVERAGE GLUCOSE BLD GHB EST-MCNC: 117 MG/DL (ref 70–100)
GFRSERPLBLD MDRD-ARVRAT: 65 ML/MIN/{1.73_M2} (ref 89–?)
GLUCOSE SERPL-MCNC: 166 MG/DL (ref 70–100)
HBA1C MFR BLD HPLC: 5.7 % (ref 4.27–6.07)
HCT VFR BLD AUTO: 42.4 % (ref 42–52)
HDLC SERPL-MCNC: 32 MG/DL
HDLC SERPL: 5.7 {RATIO} (ref ?–5)
HGB UR QL STRIP: 13.6 G/DL (ref 14–18)
LDLC SERPL CALC-MCNC: 120 MG/DL
LDLC/HDLC SERPL: 3.8 {RATIO} (ref ?–3.6)
LYMPHOCYTES # SPEC AUTO: 1.4 10^3/UL (ref 1.5–3.5)
LYMPHOCYTES NFR BLD AUTO: 16.3 %
MCH RBC QN AUTO: 29.8 PG (ref 27–31)
MCHC RBC AUTO-ENTMCNC: 32.1 G/DL (ref 32–36)
MCV RBC AUTO: 93 FL (ref 80–94)
MONOCYTES # BLD AUTO: 0.6 10^3/UL (ref 0–1)
MONOCYTES NFR BLD AUTO: 6.9 %
NEUTROPHILS # BLD AUTO: 6.4 10^3/UL (ref 1.5–6.6)
NEUTROPHILS # SNV AUTO: 8.8 X10^3/UL (ref 4.8–10.8)
NEUTROPHILS NFR BLD AUTO: 72.5 %
NRBC # BLD AUTO: 0 /100WBC
NRBC # BLD AUTO: 0 X10^3/UL
PDW BLD AUTO: 12 FL (ref 7.4–11.4)
PLATELET # BLD: 280 10^3/UL (ref 130–450)
PLATELET BLD QL SMEAR: (no result)
POTASSIUM SERPL-SCNC: 4.1 MMOL/L (ref 3.5–5)
RBC MAR: 4.56 10^6/UL (ref 4.7–6.1)
SODIUM SERPLBLD-SCNC: 144 MMOL/L (ref 135–145)
TRIGL P FAST SERPL-MCNC: 155 MG/DL
VLDLC SERPL-SCNC: 31 MG/DL

## 2022-07-17 RX ADMIN — SODIUM CHLORIDE, PRESERVATIVE FREE SCH ML: 5 INJECTION INTRAVENOUS at 16:02

## 2022-07-17 RX ADMIN — PREGABALIN SCH MG: 100 CAPSULE ORAL at 21:58

## 2022-07-17 RX ADMIN — DOCUSATE SODIUM SCH MG: 100 CAPSULE, LIQUID FILLED ORAL at 21:58

## 2022-07-17 RX ADMIN — ASPIRIN SCH MG: 81 TABLET, COATED ORAL at 08:22

## 2022-07-17 RX ADMIN — BACLOFEN SCH MG: 10 TABLET ORAL at 21:58

## 2022-07-17 RX ADMIN — METHADONE HYDROCHLORIDE SCH MG: 5 TABLET ORAL at 21:58

## 2022-07-17 RX ADMIN — DEXTROSE AND SODIUM CHLORIDE SCH MLS/HR: 5; 450 INJECTION, SOLUTION INTRAVENOUS at 08:17

## 2022-07-17 RX ADMIN — IBUPROFEN SCH MG: 600 TABLET, FILM COATED ORAL at 21:58

## 2022-07-17 RX ADMIN — SODIUM CHLORIDE, PRESERVATIVE FREE SCH: 5 INJECTION INTRAVENOUS at 08:26

## 2022-07-17 RX ADMIN — SODIUM CHLORIDE, PRESERVATIVE FREE SCH: 5 INJECTION INTRAVENOUS at 07:53

## 2022-07-17 NOTE — PROVIDER PROGRESS NOTE
Assessment/Plan





- Problem List


(1) Stroke-like symptoms


Assessment/Plan: 


More alert today


Active use of  left upper extremity


Will repeat CT brain or do MRI brain on 7/18/22


Atorvastatin 40 mg p.o. every afternoon


Baby aspirin daily.


Metoprolol succinate 25 mg p.o. daily.


Hydralazine 10 mg IV every 4 hours as needed for systolic blood pressure greater

than 160.








(2) Hypertension


Assessment/Plan: 


Metoprolol succinate 25 mg p.o. daily.


Hydralazine 10 mg IV every 4 hours as needed for systolic blood pressure greater

than 160.








(3) Diabetes mellitus


Qualifiers: 


   Diabetes mellitus type: type 2 


Assessment/Plan: 


Not on any diabetic medication.


Hemoglobin A1c today was 5.7.








(4) Hyperlipidemia


Assessment/Plan: 


Atorvastatin 40 mg p.o. every afternoon








(5) Neuropathy


Assessment/Plan: 


Lyrica 100mg po bid








(6) Depression


Assessment/Plan: 


Duloxetine 30 mg p.o. daily.








- Current Meds


Current Meds: 





                               Current Medications











Generic Name Dose Route Start Last Admin





  Trade Name Lam  PRN Reason Stop Dose Admin


 


Aspirin  81 mg  07/17/22 09:00  07/17/22 08:22





  Aspirin Ec 81 Mg Tablet  PO   81 mg





  DAILY ANKUSH   Administration


 


Duloxetine HCl  30 mg  07/17/22 08:15  07/17/22 08:22





  Duloxetine 30 Mg Capsule  PO   30 mg





  DAILY ANKUSH   Administration


 


Hydralazine HCl  10 mg  07/16/22 22:22  07/16/22 22:32





  Hydralazine Inj 20 Mg/Ml Vial  IVP   10 mg





  Q4H PRN   Administration





  PER PHYSICIAN ORDER  


 


Dextrose/Sodium Chloride  1,000 mls @ 83.333 mls/hr  07/16/22 20:00  07/17/22 

16:03





  D5.45ns  IV   83.33 mls/hr





  .Q12H ANKUSH   Infusion


 


Multi-Ingredient Ointment  1 applic  07/17/22 12:02  07/17/22 13:47





  Zinc Oxide 20% Oint 30 Gm Tube  TOP   1 applic





  PRN PRN   Administration





  Skin Care  


 


Sodium Chloride  10 ml  07/17/22 01:00  07/17/22 16:02





  Sodium Chloride Flush 0.9% 10 Ml Syringe  IVP   10 ml





  0100,0900,1700 ANKUSH   Administration














- Lab Result


[place] Diagrams: 


                                 07/17/22 05:30





                                 07/17/22 05:30





- Additional Planning


My Orders: 





My Active Orders





07/16/22 19:23


Activity Orders [RC] Q2HR 


IO [RC] IOSHIFT 


Initiate Bowel Care Protocol [RC] .protocol 


Initiate Line Care Protocol [RC] QSHIFT 


Initiate Personal Care Protoco [RC] .protocol 


Oxygen Therapy [RC] .PRN 


Vital Signs [RC] 0800,1600,0000 


Ondansetron Inj [Zofran Inj]   4 mg IVP Q6HR PRN 


Sodium Chloride Flush 0.9% [Normal Saline Flush 0.9%]   10 ml IVP PRN PRN 


Code Status [OTHERS] Routine 


Condition of Patient [OTHERS] Routine 


DVT Prophylaxis [OTHERS] Routine 





07/16/22 19:27


Evaluate and Treat OT [OT] Routine 


Evaluate and Treat PT [PT] Routine 





07/16/22 19:31


Neuro Check [RC] QSHIFT 





07/16/22 20:00


Dextrose 5%-0.45% NaCl [D5.45ns] 1,000 ml IV 83.333 mls/hr 





07/16/22 22:22


hydrALAZINE INJ [Apresoline Inj]   10 mg IVP Q4H PRN 





07/17/22 01:00


Sodium Chloride Flush 0.9% [Normal Saline Flush 0.9%]   10 ml IVP 0100,0900,1700







07/17/22 05:30


HEMOGLOBIN A1c% [CHEM] DAILYLAB 





07/17/22 08:15


DULoxetine [Cymbalta]   30 mg PO DAILY 





07/17/22 09:00


Aspirin EC [Ecotrin]   81 mg PO DAILY 





07/17/22 12:02


Zinc Oxide 20% Oint [Zinc Oxide]   1 applic TOP PRN PRN 





07/17/22 19:11


LORazepam [Ativan]   0.5 mg PO QPM PRN 





07/17/22 21:00


Atorvastatin [Lipitor]   40 mg PO QPM 


Docusate Sodium 100Mg Capsule [Colace 100Mg Capsule]   100 mg PO BID 


Latanoprost 0.005% Ophth Drops [Xalatan Ophth Drops]   1 drops EACHEYE QPM 


Loratadine [Claritin]   10 mg PO QPM 


Pregabalin [Lyrica]   100 mg PO BID 





07/17/22 22:00


Baclofen [Lioresal]   10 mg PO TID 


Ibuprofen [Motrin]   600 mg PO TID 


Methadone HCl [Methadone HCl]   10 mg PO TID 





07/18/22 05:00


BMP - BASIC METABOLIC PANEL [CHEM] DAILYLAB 


CBC - COMP BLD CT W/AUTO DIFF [HEME] DAILYLAB 





07/18/22 09:00


Furosemide [Lasix]   20 mg PO DAILY 


Metoprolol Succinate [Toprol Xl]   25 mg PO DAILY 


Potassium Chloride [Micro-K]   20 meq PO DAILY 





07/19/22 05:00


BMP - BASIC METABOLIC PANEL [CHEM] DAILYLAB 


CBC - COMP BLD CT W/AUTO DIFF [HEME] DAILYLAB 





07/20/22 05:00


BMP - BASIC METABOLIC PANEL [CHEM] DAILYLAB 


CBC - COMP BLD CT W/AUTO DIFF [HEME] DAILYLAB 





07/21/22 05:00


BMP - BASIC METABOLIC PANEL [CHEM] DAILYLAB 


CBC - COMP BLD CT W/AUTO DIFF [HEME] DAILYLAB 














Subjective





- Subjective


Patient Reports: Other (Is more awake and alert today.  However he is not able 

to speak.  He mainly mumbles.  He has good use of his left upper extremity.  He 

readily extended his hand for handshake.  He was able to eat and drink through a

straw when fed.  He did not appear to be in any discomfort.)





Objective


Vital Signs: 





                               Vital Signs - 24 hr











  07/16/22 07/16/22 07/16/22





  20:05 22:32 22:37


 


Temperature 37.2 C  


 


Heart Rate [ 73  





Monitoring   





electrodes]   


 


Respiratory 20  





Rate   


 


Blood Pressure  182/69 H 


 


Blood Pressure 193/100 H  172/65 H





[Left Brachial   





artery]   


 


O2 Saturation 94  














  07/16/22 07/16/22 07/17/22





  22:53 23:02 00:45


 


Temperature   36.7 C


 


Heart Rate [   93





Monitoring   





electrodes]   


 


Respiratory   18





Rate   


 


Blood Pressure  155/60 H 


 


Blood Pressure 155/60 H  178/75 H





[Left Brachial   





artery]   


 


O2 Saturation   95














  07/17/22 07/17/22 07/17/22





  04:56 07:30 16:00


 


Temperature 37.0 C 37.0 C 37.0 C


 


Heart Rate [ 90 91 76





Monitoring   





electrodes]   


 


Respiratory 20 18 23





Rate   


 


Blood Pressure   


 


Blood Pressure 156/55 H 152/68 H 





[Left Brachial   





artery]   


 


O2 Saturation 94 94 93








                                     Oxygen











O2 Source                      Room air


 


Oxygen Flow Rate               2














I&O (Last 24 Hrs): 





                          Intake and Output Totals x24h











 07/15/22 07/16/22 07/17/22





 23:59 23:59 23:59


 


Intake Total   1453.050


 


Balance   1453.050











General: Alert, No acute distress, Other (Mainly mumbles)


HEENT: PERRLA, EOMI


Neck: Supple, No JVD


Neuro: Alert, Other (right hemiparesis. contracture of  right hand)


Cardiovascular: Regular rate


Respiratory: Chest non-tender, No respiratory distress, Breath sounds nml


Extremities: No edema, Other (bilateral BKA)


Skin: No rashes





- Results


Results: 





                               Laboratory Results











WBC  8.8 x10^3/uL (4.8-10.8)   07/17/22  05:30    


 


RBC  4.56 10^6/uL (4.70-6.10)  L  07/17/22  05:30    


 


Hgb  13.6 g/dL (14.0-18.0)  L  07/17/22  05:30    


 


Hct  42.4 % (42.0-52.0)   07/17/22  05:30    


 


MCV  93.0 fL (80.0-94.0)   07/17/22  05:30    


 


MCH  29.8 pg (27.0-31.0)   07/17/22  05:30    


 


MCHC  32.1 g/dL (32.0-36.0)   07/17/22  05:30    


 


RDW  13.3 % (12.0-15.0)   07/17/22  05:30    


 


Plt Count  280 10^3/uL (130-450)   07/17/22  05:30    


 


MPV  12.0 fL (7.4-11.4)  H  07/17/22  05:30    


 


Neut # (Auto)  6.4 10^3/uL (1.5-6.6)   07/17/22  05:30    


 


Lymph # (Auto)  1.4 10^3/uL (1.5-3.5)  L  07/17/22  05:30    


 


Mono # (Auto)  0.6 10^3/uL (0.0-1.0)   07/17/22  05:30    


 


Eos # (Auto)  0.2 10^3/uL (0.0-0.7)   07/17/22  05:30    


 


Baso # (Auto)  0.1 10^3/uL (0.0-0.1)   07/17/22  05:30    


 


Absolute Nucleated RBC  0.00 x10^3/uL  07/17/22  05:30    


 


Nucleated RBC %  0.0 /100WBC  07/17/22  05:30    


 


Platelet Estimate  NORMAL (130-450,000)  (NORMAL)   07/17/22  05:30    


 


PT  12.6 secs (9.9-12.6)   07/16/22  10:08    


 


INR  1.1  (0.8-1.2)   07/16/22  10:08    


 


Sodium  144 mmol/L (135-145)   07/17/22  05:30    


 


Potassium  4.1 mmol/L (3.5-5.0)   07/17/22  05:30    


 


Chloride  109 mmol/L (101-111)   07/17/22  05:30    


 


Carbon Dioxide  26 mmol/L (21-32)   07/17/22  05:30    


 


Anion Gap  9.0  (6-13)   07/17/22  05:30    


 


BUN  39 mg/dL (6-20)  H  07/17/22  05:30    


 


Creatinine  1.1 mg/dL (0.6-1.2)   07/17/22  05:30    


 


Estimated GFR (MDRD)  65  (>89)  L  07/17/22  05:30    


 


Glucose  166 mg/dL ()  H  07/17/22  05:30    


 


Lactic Acid  1.0 mmol/L (0.5-2.2)   07/16/22  10:40    


 


Calcium  9.3 mg/dL (8.5-10.3)   07/17/22  05:30    


 


Magnesium  2.1 mg/dL (1.7-2.8)   07/16/22  10:08    


 


Total Bilirubin  0.6 mg/dL (0.2-1.0)   07/16/22  10:08    


 


AST  15 IU/L (10-42)   07/16/22  10:08    


 


ALT  21 IU/L (10-60)   07/16/22  10:08    


 


Alkaline Phosphatase  63 IU/L ()   07/16/22  10:08    


 


Troponin I High Sens  7.8 ng/L (2.3-19.7)   07/16/22  10:08    


 


B-Natriuretic Peptide  12 pg/mL (5-100)   07/16/22  10:08    


 


Total Protein  7.5 g/dL (6.7-8.2)   07/16/22  10:08    


 


Albumin  3.7 g/dL (3.2-5.5)   07/16/22  10:08    


 


Globulin  3.8 g/dL (2.1-4.2)   07/16/22  10:08    


 


Albumin/Globulin Ratio  1.0  (1.0-2.2)   07/16/22  10:08    


 


Triglycerides  155 mg/dL (-149) H  07/17/22  05:30    


 


Cholesterol  183 mg/dL (-199)  07/17/22  05:30    


 


LDL Cholesterol, Calc  120 mg/dL (-129)  07/17/22  05:30    


 


VLDL Cholesterol  31 mg/dL  07/17/22  05:30    


 


HDL Cholesterol  32 mg/dL (60-) L  07/17/22  05:30    


 


LDL/HDL Ratio  3.8  (<3.6)   07/17/22  05:30    


 


Cholesterol/HDL Ratio  5.7  (<5.0)   07/17/22  05:30    


 


Lipase  24 U/L (22-51)   07/16/22  10:08    


 


Urine Color  YELLOW   07/16/22  13:00    


 


Urine Clarity  CLEAR  (CLEAR)   07/16/22  13:00    


 


Urine pH  5.5 PH (5.0-7.5)   07/16/22  13:00    


 


Ur Specific Gravity  1.020  (1.002-1.030)   07/16/22  13:00    


 


Urine Protein  NEGATIVE mg/dL (NEGATIVE)   07/16/22  13:00    


 


Urine Glucose (UA)  NEGATIVE mg/dL (NEGATIVE)   07/16/22  13:00    


 


Urine Ketones  NEGATIVE mg/dL (NEGATIVE)   07/16/22  13:00    


 


Urine Occult Blood  NEGATIVE  (NEGATIVE)   07/16/22  13:00    


 


Urine Nitrite  NEGATIVE  (NEGATIVE)   07/16/22  13:00    


 


Urine Bilirubin  NEGATIVE  (NEGATIVE)   07/16/22  13:00    


 


Urine Urobilinogen  0.2 (NORMAL) E.U./dL (NORMAL)   07/16/22  13:00    


 


Ur Leukocyte Esterase  NEGATIVE  (NEGATIVE)   07/16/22  13:00    


 


Ur Microscopic Review  NOT INDICATED   07/16/22  13:00    


 


Urine Culture Comments  NOT INDICATED   07/16/22  13:00    


 


SARS-CoV-2 (PCR)  NOT DETECTED   07/16/22  10:49    


 


Blood Type  O POSITIVE   07/16/22  10:40    


 


Antibody Screen  NEGATIVE   07/16/22  10:40    














- Procedures


Procedures: 





Procedures





TRANSFUSE NONAUT RED BLOOD CELLS IN PERIPH VEIN, PERC (01/14/17)











ABX Reporting


Has patient been on IV antibiotics over the past 48 hours?: No

## 2022-07-17 NOTE — PHARMACY PROGRESS NOTE
- Best Possible Medication History


Admit Date and Time: 07/16/22 1923


Processed by: Pharmacy


Medication History completed: Yes


Patient Interview: Pt unable to participate


Secondary Source(s): Facility MAR as ONLY source





As the person ultimately responsible for medication therapy, providers are able 

to order a medication from an existing home medication list in [facility] via the 

"Reconcile Routine" prior to Confirmation of that medication by support staff. 

Such practice is discouraged except when the physician, in their clinical 

judgment, deems that a medical need exists for a medication without regard to 

previous use.

## 2022-07-18 ENCOUNTER — HOSPITAL ENCOUNTER (OUTPATIENT)
Dept: HOSPITAL 76 - EMS | Age: 77
Discharge: HOME | End: 2022-07-18
Attending: INTERNAL MEDICINE
Payer: MEDICARE

## 2022-07-18 VITALS — DIASTOLIC BLOOD PRESSURE: 67 MMHG | SYSTOLIC BLOOD PRESSURE: 131 MMHG

## 2022-07-18 DIAGNOSIS — Z89.612: ICD-10-CM

## 2022-07-18 DIAGNOSIS — R41.82: ICD-10-CM

## 2022-07-18 DIAGNOSIS — Z89.611: ICD-10-CM

## 2022-07-18 DIAGNOSIS — I69.351: Primary | ICD-10-CM

## 2022-07-18 DIAGNOSIS — Z74.01: ICD-10-CM

## 2022-07-18 LAB
ANION GAP SERPL CALCULATED.4IONS-SCNC: 13 MMOL/L (ref 6–13)
BASOPHILS NFR BLD AUTO: 0.1 10^3/UL (ref 0–0.1)
BASOPHILS NFR BLD AUTO: 0.8 %
BUN SERPL-MCNC: 23 MG/DL (ref 6–20)
CALCIUM UR-MCNC: 9 MG/DL (ref 8.5–10.3)
CHLORIDE SERPL-SCNC: 105 MMOL/L (ref 101–111)
CO2 SERPL-SCNC: 26 MMOL/L (ref 21–32)
CREAT SERPLBLD-SCNC: 0.8 MG/DL (ref 0.6–1.2)
EOSINOPHIL # BLD AUTO: 0.4 10^3/UL (ref 0–0.7)
EOSINOPHIL NFR BLD AUTO: 4.9 %
ERYTHROCYTE [DISTWIDTH] IN BLOOD BY AUTOMATED COUNT: 13.2 % (ref 12–15)
GFRSERPLBLD MDRD-ARVRAT: 94 ML/MIN/{1.73_M2} (ref 89–?)
GLUCOSE SERPL-MCNC: 101 MG/DL (ref 70–100)
HCT VFR BLD AUTO: 41.8 % (ref 42–52)
HGB UR QL STRIP: 13.2 G/DL (ref 14–18)
LYMPHOCYTES # SPEC AUTO: 1.9 10^3/UL (ref 1.5–3.5)
LYMPHOCYTES NFR BLD AUTO: 26.8 %
MCH RBC QN AUTO: 29.6 PG (ref 27–31)
MCHC RBC AUTO-ENTMCNC: 31.6 G/DL (ref 32–36)
MCV RBC AUTO: 93.7 FL (ref 80–94)
MONOCYTES # BLD AUTO: 0.6 10^3/UL (ref 0–1)
MONOCYTES NFR BLD AUTO: 7.8 %
NEUTROPHILS # BLD AUTO: 4.2 10^3/UL (ref 1.5–6.6)
NEUTROPHILS # SNV AUTO: 7.1 X10^3/UL (ref 4.8–10.8)
NEUTROPHILS NFR BLD AUTO: 58.9 %
NRBC # BLD AUTO: 0 /100WBC
NRBC # BLD AUTO: 0 X10^3/UL
PDW BLD AUTO: 10.9 FL (ref 7.4–11.4)
PLATELET # BLD: 252 10^3/UL (ref 130–450)
POTASSIUM SERPL-SCNC: 3.4 MMOL/L (ref 3.5–5)
RBC MAR: 4.46 10^6/UL (ref 4.7–6.1)
SODIUM SERPLBLD-SCNC: 144 MMOL/L (ref 135–145)

## 2022-07-18 RX ADMIN — BACLOFEN SCH MG: 10 TABLET ORAL at 05:50

## 2022-07-18 RX ADMIN — IBUPROFEN SCH MG: 600 TABLET, FILM COATED ORAL at 05:49

## 2022-07-18 RX ADMIN — DOCUSATE SODIUM SCH MG: 100 CAPSULE, LIQUID FILLED ORAL at 09:03

## 2022-07-18 RX ADMIN — SODIUM CHLORIDE, PRESERVATIVE FREE SCH ML: 5 INJECTION INTRAVENOUS at 09:03

## 2022-07-18 RX ADMIN — PREGABALIN SCH MG: 100 CAPSULE ORAL at 09:03

## 2022-07-18 RX ADMIN — SODIUM CHLORIDE, PRESERVATIVE FREE SCH ML: 5 INJECTION INTRAVENOUS at 00:43

## 2022-07-18 RX ADMIN — IBUPROFEN SCH MG: 600 TABLET, FILM COATED ORAL at 13:45

## 2022-07-18 RX ADMIN — METHADONE HYDROCHLORIDE SCH MG: 5 TABLET ORAL at 13:45

## 2022-07-18 RX ADMIN — METHADONE HYDROCHLORIDE SCH MG: 5 TABLET ORAL at 05:51

## 2022-07-18 RX ADMIN — BACLOFEN SCH MG: 10 TABLET ORAL at 13:45

## 2022-07-18 RX ADMIN — ASPIRIN SCH MG: 81 TABLET, COATED ORAL at 09:03

## 2022-07-18 NOTE — DISCHARGE PLAN
Discharge Plan for SNF / [facility]





- Discharge Plan And Transition Orders


Problem Reviewed?: Yes


Disposition: 03 SNF DC/Xfer


Condition: Stable


Allergies and Adverse Reactions: 


                                    Allergies











Allergy/AdvReac Type Severity Reaction Status Date / Time


 


gabapentin Allergy  Unknown Verified 07/16/22 10:26











Health Concerns: 


Patient was admitted with stroke-like symptoms which included no verbal 

communication and could not use his left  arm. There is a history of right-sided

paralysis from several old strokes and he has bilateral AKAs. His work-up in the

ED included CT of the head that showed the old strokes. A neurology consultation

was done with the telestroke consultant.  He did not qualify to get intervention

or tPA therefore no CT angios of the brain or neck were done. His left arm 

weakness started to improve after several hours.  By the following day he was 

able to use the left arm and could pull off all his telemetry leads.  On the day

of discharge he was feeding himself with the left arm, following directions and 

communicating with grunting, which appears to be his baseline. He was 

transferred back to [facility].





Plan of Treatment: 


Resume all previous pre-hospital medications and management.





Take 1 baby aspirin daily, if there are no contra-indications.





Care Goals: 


Improvement in symptoms and stabilization are the goals.





Assessment: 


These orders are provided for his Nursing Home.








- SNF / [facility]DC Transition Orders


Admit to (Facility): [facility]


Under the care of (Name): Castillo Graham MD


Discharge Diagnosis: 





(1) TIA


Symptoms have resolved and he is back to his baseline.





(2) Old CVA


He has chronic R-sided hemiparesis and speech is with grunting. It is unclear 

why there is no daily aspirin on his med list. A baby aspirin daily is advised, 

if there are no contra-indications.





(3) Bilateral AKAs





(4) Hypertension





(5) Diabetes mellitus


His A1c was 5.7. He is not on Diabetic meds but unclear why he is not on a 

diabetic diet (?)





(6) Hypertriglyceridemia


His fasting lipid panel showed a Triglyceride level of 155 and low HDL, 

consistent with being a Diabetic.





(7) Neuropathy





(8) Depression





(9) Morbid obesity, BMI 62





Medicare Certification Statement: 


I certify that Post Hospital skilled nursing care is medically necessary on a 

continuing basis for any of the conditions for which she/he is receiving care 

during hospitalization.





Notify PCP of admission and forward orders to primary provider for signature.





Weight on admission and: Weekly


Call PCP immediately if weight increases by: 5 kg


Other Notification Orders: 


Call PCP immediately if patient develops dyspnea, chest pain/tightness or edema.





House Bowel Program: Yes


Additional Bowel Program Orders: 


If no BM after 2 days, nurse may give M.O.M. 30ml PO PRN and/or ducolax Supp 1 

IN and/or SUNIL 250mg P.O., and/or senna 1-2 tabs PO.  On day 3 nurse may give 

repeat above order until residents constipation is resolved. 





Annual Influenza Vaccine (between Sept 1st and March 31st): Yes


Two-step PPD per [facility] [phone] or approved exception documents: Yes


Medication Orders: 





PLEASE REFER TO THE DISCHARGE MEDICATION LIST.








Insulin Orders?: No





- Medications


New Prescriptions: 


Aspirin EC [Ecotrin] 81 mg PO DAILY #30 tablet





- Diet


Type: No added sugar


Texture: Puree


Liquids: Nectar thick


May have monthly special meal: Yes





- Therapies | Activity


Rehabilitation Potential: Maintain present ADL Functional


Weight Bearing: No Weight


Follow Up: 





Next PCP visit as per routine.

## 2022-07-18 NOTE — DISCHARGE SUMMARY
Discharge Summary


Admit Date: 07/16/22


Discharge Date: 07/18/22


Discharging Provider: Dr Usha Lomeli


Primary Care Provider: Dr Castillo Graham


Code Status: Do Not Attempt Resuscitation


Condition at Discharge: Stable


Discharge Disposition: 03 SNF DC/Xfer





- HPI


History of Present Illness: 





From the admission H&P of Dr. Marilin Lopezu:


Patient is a 77-year-old male with a previous stroke and bilateral AKA 

presenting for evaluation of altered mental status, possible code stroke.  He 

was last seen at his normal around 9:00 this morning, 2 hours before ED pre

sentation. Sometime thereafter, staff noted that he was not acting at his 

baseline and appeared to have new weakness. He normally will answer questions 

but nonsensically.  He does have hemiparesis to the right.  He was noted to have

left-sided facial droop and new left arm weakness. Per EMS, blood sugar was 108.

Patient resides at [facility] in [place].  History is limited. He is not 

reportedly on a blood thinner. He reportedly recently finished a course of 

antibiotics - EMS was told he was on it for gout."





HPI above is obtained from the ED provider's H&P Because the patient is unable 

to provide a history.  This is likely due to his strokes. At bedside he appears 

comfortable.  He does not respond to questions asked though he is awake and 

alert. Right hemiparesis noted. L arm weakness is noted. Bilateral AKA noted.








- CONSULTS | PROCEDURES


Consultations: TeleStroke





- HOSPITAL COURSE


Hospital Course: 





(1) TIA


Patient was admitted with stroke-like symptoms which included no verbal 

communication and could not use his left  arm. There is a history of right-sided

paralysis from several old strokes and he normally speaks by grunting. His work-

up in the ED included CT of the head that showed the large old L MCA territory 

stroke. In the ED, a neurology consultation was done with the telestroke 

consultant.  The patient did not qualify to get intervention or tPA, therefore 

no CT angios of the brain or neck were done. His left arm weakness started to 

improve after several hours.  By the following day he was able to use the left 

arm and could pull off all his telemetry leads.  He did not undergo carotid ev

aluation since he is not a candidate for intervention.  He did not have an 

Echocardiogram since we do not have the Echo service here on the 2.5 days he was

here. On the day of discharge, he was feeding himself with the left arm, 

following directions and communicating with grunting, which appears to be his 

baseline. He was transferred back to [facility].





(2) Old CVA


He has chronic R-sided hemiparesis and speech is with grunting. It is unclear 

why there is no daily aspirin on his med list. A baby aspirin daily was advised,

if there are no contra-indications.





(3) Bilateral AKAs


As per Hx and he does not get OOB, as per Hx from [facility].





(4) Hypertension


His usual medications were continued.





(5) Diabetes mellitus


His A1c was 5.7. He is not on Diabetic meds but unclear why he is not on a low-

sugar.





(6) Hypertriglyceridemia


His fasting lipid panel showed a Triglyceride level of 155 and low HDL of 32, 

consistent with being a Diabetic.





(7) Neuropathy


His usual medications were continued.





(8) Depression


His usual medications were continued.





(9) Morbid obesity, BMI 62


As per Hx.








- ALLERGIES


Allergies/Adverse Reactions: 


                                    Allergies











Allergy/AdvReac Type Severity Reaction Status Date / Time


 


gabapentin Allergy  Unknown Verified 07/16/22 10:26














- MEDICATIONS


Home Medications: 


                                Ambulatory Orders











 Medication  Instructions  Recorded  Confirmed


 


LORazepam [Lorazepam] 0.5 mg PO QPM PRN 02/05/16 07/17/22


 


Furosemide [Lasix] 20 mg PO DAILY 12/14/16 07/17/22


 


Potassium Chloride 20 meq PO DAILY 12/14/16 07/17/22


 


Baclofen [Lioresal] 10 mg PO TID 07/17/22 07/17/22


 


DULoxetine [Cymbalta] 30 mg PO DAILY 07/17/22 07/17/22


 


Docusate Sodium [Dulcolax Stool 100 mg PO BID 07/17/22 07/17/22





Softener]   


 


Ibuprofen [Motrin] 600 mg PO TID 07/17/22 07/17/22


 


Latanoprost 0.005% Ophth Drops 1 drops EACHEYE QPM 07/17/22 07/17/22





[Xalatan Ophth Drops]   


 


Loratadine [Claritin] 10 mg PO QPM 07/17/22 07/17/22


 


Methadone HCl 10 mg PO TID 07/17/22 07/17/22


 


Metoprolol Succinate [Toprol Xl] 25 mg PO DAILY 07/17/22 07/17/22


 


Pregabalin [Lyrica] 100 mg PO BID 07/17/22 07/17/22


 


Aspirin EC [Ecotrin] 81 mg PO DAILY #30 tablet 07/18/22 


 


Zinc Oxide 20% Oint [Zinc Oxide] 1 applic TOP PRN PRN 07/18/22 














- PHYSICAL EXAM AT DISCHARGE


General Appearance: positive: No acute distress, Alert, Other (Male pattern 

baldness.  Morbidly obese.)


Eyes Bilateral: positive: Normal inspection


ENT: positive: ENT inspection nml, No signs of dehydration


Neck: positive: Nml inspection, Other (Obese and cannot evaluate JVP)


Respiratory: positive: No respiratory distress


Cardiovascular: positive: Regular rate & rhythm


Abdomen: positive: Non-tender, Other (Obese with a pannus)


Skin: positive: Warm, Dry


Extremities: positive: Other (Bilateral AKAs)


Neurologic/Psychiatric: positive: Other (He is using his left arm without 

difficulty.  The right arm and right leg stump are paralyzed.  He speaks by 

grunting.  He follows directions.)





- LABS


Result Diagrams: 


                                 07/18/22 05:33





                                 07/18/22 05:33





- DIAGNOSTIC IMAGING


Diagnostic Imaging Results: Final report reviewed





- FOLLOW UP


Follow Up: 





See PCP in routine follow-up.








- TIME SPENT


Time Spent in Discharge (Minutes): 60

## 2022-12-08 ENCOUNTER — HOSPITAL ENCOUNTER (OUTPATIENT)
Dept: HOSPITAL 76 - LAB.R | Age: 77
Discharge: HOME | End: 2022-12-08
Attending: INTERNAL MEDICINE
Payer: MEDICARE

## 2022-12-08 DIAGNOSIS — L29.9: Primary | ICD-10-CM

## 2022-12-08 LAB
ALBUMIN DIAFP-MCNC: 3.5 G/DL (ref 3.2–5.5)
ALBUMIN/GLOB SERPL: 1.2 {RATIO} (ref 1–2.2)
ALP SERPL-CCNC: 52 IU/L (ref 42–121)
ALT SERPL W P-5'-P-CCNC: 17 IU/L (ref 10–60)
ANION GAP SERPL CALCULATED.4IONS-SCNC: 8 MMOL/L (ref 6–13)
AST SERPL W P-5'-P-CCNC: 17 IU/L (ref 10–42)
BILIRUB BLD-MCNC: 0.4 MG/DL (ref 0.2–1)
BUN SERPL-MCNC: 19 MG/DL (ref 6–20)
CALCIUM UR-MCNC: 8.8 MG/DL (ref 8.5–10.3)
CHLORIDE SERPL-SCNC: 102 MMOL/L (ref 101–111)
CO2 SERPL-SCNC: 29 MMOL/L (ref 21–32)
CREAT SERPLBLD-SCNC: 0.7 MG/DL (ref 0.6–1.2)
GFRSERPLBLD MDRD-ARVRAT: 110 ML/MIN/{1.73_M2} (ref 89–?)
GLOBULIN SER-MCNC: 3 G/DL (ref 2.1–4.2)
GLUCOSE SERPL-MCNC: 98 MG/DL (ref 70–100)
POTASSIUM SERPL-SCNC: 3.3 MMOL/L (ref 3.5–5)
PROT SPEC-MCNC: 6.5 G/DL (ref 6.7–8.2)
SODIUM SERPLBLD-SCNC: 139 MMOL/L (ref 135–145)

## 2022-12-08 PROCEDURE — 80053 COMPREHEN METABOLIC PANEL: CPT

## 2023-08-07 ENCOUNTER — HOSPITAL ENCOUNTER (OUTPATIENT)
Dept: HOSPITAL 76 - DI | Age: 78
Discharge: HOME | End: 2023-08-07
Attending: REGISTERED NURSE
Payer: MEDICARE

## 2023-08-07 DIAGNOSIS — R06.89: Primary | ICD-10-CM

## 2023-08-07 DIAGNOSIS — M48.54XA: ICD-10-CM

## 2023-08-07 NOTE — XRAY REPORT
PROCEDURE:  Chest 2 View X-Ray

 

INDICATIONS:  PNEUMONIA

 

TECHNIQUE:  2 views of the chest were acquired.  

 

COMPARISON:  Chest radiograph 7/16/2022

 

FINDINGS:  

 

Surgical changes and devices:  None.  

 

Lungs and pleura:  Evaluation is suboptimal due to patient positioning. The patient's head obscures t
he lung apices on frontal view. A probable wheelchair or external device projects over the posterior 
lungs on lateral view. Mildly low lung volumes bilaterally. No focal pulmonary opacity is seen. No de
finite pleural effusion or pneumothorax.

 

Mediastinum:  Mediastinal contours appear normal.  Heart size is normal.  

 

Bones and chest wall:  Moderate compression fracture is seen at the mid thoracic spine. Old healed ri
ght-sided rib fractures.

 

IMPRESSION:  

1.Mildly low lung volumes and suboptimal positioning as described above. No acute cardiopulmonary abn
ormality is seen. 

2.Age-indeterminate mid thoracic spine compression fracture.

 

 

 

Reviewed by: Sathish Morales MD on 8/7/2023 4:09 PM PDT

Approved by: Sathish Morales MD on 8/7/2023 4:09 PM PDT

 

 

Station ID:  [id number]

## 2023-08-21 ENCOUNTER — HOSPITAL ENCOUNTER (OUTPATIENT)
Dept: HOSPITAL 76 - LAB.R | Age: 78
Discharge: HOME | End: 2023-08-21
Attending: REGISTERED NURSE
Payer: MEDICARE

## 2023-08-21 DIAGNOSIS — D50.8: ICD-10-CM

## 2023-08-21 DIAGNOSIS — I10: Primary | ICD-10-CM

## 2023-08-21 DIAGNOSIS — E56.8: ICD-10-CM

## 2023-08-21 LAB
ALBUMIN DIAFP-MCNC: 3.6 G/DL (ref 3.2–5.5)
ALBUMIN/GLOB SERPL: 1.3 {RATIO} (ref 1–2.2)
ALP SERPL-CCNC: 57 IU/L (ref 42–121)
ALT SERPL W P-5'-P-CCNC: 10 IU/L (ref 10–60)
ANION GAP SERPL CALCULATED.4IONS-SCNC: 6 MMOL/L (ref 6–13)
AST SERPL W P-5'-P-CCNC: 14 IU/L (ref 10–42)
BILIRUB BLD-MCNC: 0.4 MG/DL (ref 0.2–1)
BUN SERPL-MCNC: 13 MG/DL (ref 6–20)
CALCIUM UR-MCNC: 8.8 MG/DL (ref 8.5–10.3)
CHLORIDE SERPL-SCNC: 104 MMOL/L (ref 101–111)
CO2 SERPL-SCNC: 27 MMOL/L (ref 21–32)
CREAT SERPLBLD-SCNC: 0.6 MG/DL (ref 0.6–1.3)
GFRSERPLBLD MDRD-ARVRAT: 131 ML/MIN/{1.73_M2} (ref 89–?)
GLOBULIN SER-MCNC: 2.7 G/DL (ref 2.1–4.2)
GLUCOSE SERPL-MCNC: 124 MG/DL (ref 74–104)
POTASSIUM SERPL-SCNC: 3.7 MMOL/L (ref 3.5–4.5)
PROT SPEC-MCNC: 6.3 G/DL (ref 6.4–8.9)
SODIUM SERPLBLD-SCNC: 137 MMOL/L (ref 135–145)

## 2023-08-21 PROCEDURE — 80053 COMPREHEN METABOLIC PANEL: CPT

## 2023-08-21 PROCEDURE — 85025 COMPLETE CBC W/AUTO DIFF WBC: CPT

## 2023-11-08 ENCOUNTER — HOSPITAL ENCOUNTER (OUTPATIENT)
Dept: HOSPITAL 76 - LAB.R | Age: 78
Discharge: HOME | End: 2023-11-08
Attending: REGISTERED NURSE
Payer: MEDICARE

## 2023-11-08 DIAGNOSIS — I73.9: Primary | ICD-10-CM

## 2023-11-08 LAB
ANION GAP SERPL CALCULATED.4IONS-SCNC: 7 MMOL/L (ref 6–13)
BUN SERPL-MCNC: 20 MG/DL (ref 6–20)
CALCIUM UR-MCNC: 8.8 MG/DL (ref 8.5–10.3)
CHLORIDE SERPL-SCNC: 104 MMOL/L (ref 101–111)
CO2 SERPL-SCNC: 30 MMOL/L (ref 21–32)
CREAT SERPLBLD-SCNC: 0.7 MG/DL (ref 0.6–1.3)
GFRSERPLBLD MDRD-ARVRAT: 110 ML/MIN/{1.73_M2} (ref 89–?)
GLUCOSE SERPL-MCNC: 124 MG/DL (ref 74–104)
POTASSIUM SERPL-SCNC: 4.1 MMOL/L (ref 3.5–4.5)
SODIUM SERPLBLD-SCNC: 141 MMOL/L (ref 135–145)

## 2023-11-08 PROCEDURE — 80048 BASIC METABOLIC PNL TOTAL CA: CPT

## 2024-04-08 ENCOUNTER — HOSPITAL ENCOUNTER (OUTPATIENT)
Dept: HOSPITAL 76 - LAB.R | Age: 79
Discharge: HOME | End: 2024-04-08
Attending: REGISTERED NURSE
Payer: MEDICARE

## 2024-04-08 DIAGNOSIS — I10: ICD-10-CM

## 2024-04-08 DIAGNOSIS — E11.65: Primary | ICD-10-CM

## 2024-04-08 LAB
ALBUMIN DIAFP-MCNC: 3.4 G/DL (ref 3.2–5.5)
ALBUMIN/GLOB SERPL: 1.1 {RATIO} (ref 1–2.2)
ALP SERPL-CCNC: 54 IU/L (ref 42–121)
ALT SERPL W P-5'-P-CCNC: 10 IU/L (ref 10–60)
ANION GAP SERPL CALCULATED.4IONS-SCNC: 6 MMOL/L (ref 6–13)
AST SERPL W P-5'-P-CCNC: 11 IU/L (ref 10–42)
BILIRUB BLD-MCNC: 0.3 MG/DL (ref 0.2–1)
BUN SERPL-MCNC: 25 MG/DL (ref 6–20)
CALCIUM UR-MCNC: 9.1 MG/DL (ref 8.5–10.3)
CHLORIDE SERPL-SCNC: 107 MMOL/L (ref 101–111)
CO2 SERPL-SCNC: 29 MMOL/L (ref 21–32)
CREAT SERPLBLD-SCNC: 0.8 MG/DL (ref 0.6–1.3)
EST. AVERAGE GLUCOSE BLD GHB EST-MCNC: 97 MG/DL (ref 70–100)
GFRSERPLBLD MDRD-ARVRAT: 94 ML/MIN/{1.73_M2} (ref 89–?)
GLOBULIN SER-MCNC: 3 G/DL (ref 2.1–4.2)
GLUCOSE SERPL-MCNC: 114 MG/DL (ref 74–104)
HBA1C MFR BLD HPLC: 5 % (ref 4.27–6.07)
POTASSIUM SERPL-SCNC: 4.9 MMOL/L (ref 3.5–4.5)
PROT SPEC-MCNC: 6.4 G/DL (ref 6.4–8.9)
SODIUM SERPLBLD-SCNC: 142 MMOL/L (ref 135–145)

## 2024-04-08 PROCEDURE — 85025 COMPLETE CBC W/AUTO DIFF WBC: CPT

## 2024-04-08 PROCEDURE — 83036 HEMOGLOBIN GLYCOSYLATED A1C: CPT

## 2024-04-08 PROCEDURE — 80053 COMPREHEN METABOLIC PANEL: CPT

## 2024-04-10 ENCOUNTER — HOSPITAL ENCOUNTER (OUTPATIENT)
Dept: HOSPITAL 76 - LAB.R | Age: 79
Discharge: HOME | End: 2024-04-10
Attending: REGISTERED NURSE
Payer: MEDICARE

## 2024-04-10 DIAGNOSIS — I50.9: Primary | ICD-10-CM

## 2024-04-10 PROCEDURE — 80053 COMPREHEN METABOLIC PANEL: CPT

## 2024-04-11 ENCOUNTER — HOSPITAL ENCOUNTER (OUTPATIENT)
Dept: HOSPITAL 76 - LAB.R | Age: 79
Discharge: HOME | End: 2024-04-11
Attending: REGISTERED NURSE
Payer: MEDICARE

## 2024-04-11 DIAGNOSIS — E78.49: ICD-10-CM

## 2024-04-11 DIAGNOSIS — I50.9: ICD-10-CM

## 2024-04-11 DIAGNOSIS — I11.0: Primary | ICD-10-CM

## 2024-04-11 LAB
ALBUMIN DIAFP-MCNC: 3.3 G/DL (ref 3.2–5.5)
ALBUMIN/GLOB SERPL: 1.2 {RATIO} (ref 1–2.2)
ALP SERPL-CCNC: 55 IU/L (ref 42–121)
ALT SERPL W P-5'-P-CCNC: 7 IU/L (ref 10–60)
ANION GAP SERPL CALCULATED.4IONS-SCNC: 3 MMOL/L (ref 6–13)
AST SERPL W P-5'-P-CCNC: 8 IU/L (ref 10–42)
BILIRUB BLD-MCNC: 0.4 MG/DL (ref 0.2–1)
BUN SERPL-MCNC: 21 MG/DL (ref 6–20)
CALCIUM UR-MCNC: 8.9 MG/DL (ref 8.5–10.3)
CHLORIDE SERPL-SCNC: 105 MMOL/L (ref 101–111)
CO2 SERPL-SCNC: 32 MMOL/L (ref 21–32)
CREAT SERPLBLD-SCNC: 0.8 MG/DL (ref 0.6–1.3)
GFRSERPLBLD MDRD-ARVRAT: 94 ML/MIN/{1.73_M2} (ref 89–?)
GLOBULIN SER-MCNC: 2.7 G/DL (ref 2.1–4.2)
GLUCOSE SERPL-MCNC: 107 MG/DL (ref 74–104)
POTASSIUM SERPL-SCNC: 3.8 MMOL/L (ref 3.5–4.5)
PROT SPEC-MCNC: 6 G/DL (ref 6.4–8.9)
SODIUM SERPLBLD-SCNC: 140 MMOL/L (ref 135–145)

## 2024-04-11 PROCEDURE — 80053 COMPREHEN METABOLIC PANEL: CPT

## 2024-04-14 ENCOUNTER — HOSPITAL ENCOUNTER (OUTPATIENT)
Dept: HOSPITAL 76 - LAB.R | Age: 79
Discharge: HOME | End: 2024-04-14
Attending: REGISTERED NURSE
Payer: MEDICARE

## 2024-04-14 DIAGNOSIS — G54.6: Primary | ICD-10-CM

## 2024-04-14 LAB
ANION GAP SERPL CALCULATED.4IONS-SCNC: 5 MMOL/L (ref 6–13)
BUN SERPL-MCNC: 20 MG/DL (ref 6–20)
CALCIUM UR-MCNC: 9.2 MG/DL (ref 8.5–10.3)
CHLORIDE SERPL-SCNC: 108 MMOL/L (ref 101–111)
CO2 SERPL-SCNC: 30 MMOL/L (ref 21–32)
CREAT SERPLBLD-SCNC: 0.8 MG/DL (ref 0.6–1.3)
GFRSERPLBLD MDRD-ARVRAT: 94 ML/MIN/{1.73_M2} (ref 89–?)
GLUCOSE SERPL-MCNC: 121 MG/DL (ref 74–104)
POTASSIUM SERPL-SCNC: 4.1 MMOL/L (ref 3.5–4.5)
SODIUM SERPLBLD-SCNC: 143 MMOL/L (ref 135–145)

## 2024-04-14 PROCEDURE — 80048 BASIC METABOLIC PNL TOTAL CA: CPT

## 2024-04-17 ENCOUNTER — HOSPITAL ENCOUNTER (OUTPATIENT)
Dept: HOSPITAL 76 - DI | Age: 79
Discharge: HOME | End: 2024-04-17
Attending: REGISTERED NURSE
Payer: MEDICARE

## 2024-04-17 DIAGNOSIS — R06.89: Primary | ICD-10-CM

## 2024-04-17 NOTE — XRAY REPORT
PROCEDURE:  Chest 2V

 

INDICATIONS:  BREATHING DIFFICULTY

 

TECHNIQUE:  2 views of the chest were acquired.  

 

COMPARISON:  Chest x-ray 8/7/2023

 

FINDINGS:  

 

Surgical changes and devices:  None.  

 

Lungs and pleura:  No pleural effusions or pneumothorax.  Lungs are clear.  

 

Mediastinum:  Mediastinal contours appear normal.  Heart size is normal.  

 

Bones and chest wall:  No suspicious bony lesions.  Overlying soft tissues appear unremarkable.  

 

 

IMPRESSION:  

 

No acute cardiopulmonary process.

 

 

 

Reviewed by: Tamara Bermeo MD on 4/17/2024 4:17 PM PDT

Approved by: Tamara Bermeo MD on 4/17/2024 4:17 PM PDT

 

 

Station ID:  [id number]

## 2024-05-30 ENCOUNTER — HOSPITAL ENCOUNTER (OUTPATIENT)
Dept: HOSPITAL 76 - DI | Age: 79
Discharge: HOME | End: 2024-05-30
Payer: MEDICARE

## 2024-05-30 DIAGNOSIS — M62.421: Primary | ICD-10-CM

## 2024-05-30 DIAGNOSIS — M79.601: ICD-10-CM

## 2024-05-30 NOTE — XRAY REPORT
PROCEDURE:  Forearm RT

 

INDICATIONS:  RIGHT ARM PAIN

 

TECHNIQUE:  2 views of the forearm were acquired.  

 

COMPARISON:  X-ray humerus 5/30/2024

 

FINDINGS:  

 

Bones:  No fractures or dislocations.  No suspicious bony lesions.  

 

Soft tissues:  No suspicious soft tissue calcifications or masses.  

 

IMPRESSION:  

No visualized acute fracture or dislocation. However, occult injury cannot be excluded. Recommend marah
rt interval imaging follow-up in 7-10 days as clinically indicated for additional evaluation.

 

 

 

Reviewed by: Tamara Bermeo MD on 5/30/2024 1:12 PM PDT

Approved by: Tamara Bermeo MD on 5/30/2024 1:12 PM PDT

 

 

Station ID:  [id number]

## 2024-05-30 NOTE — XRAY REPORT
PROCEDURE:  Humerus RT

 

INDICATIONS:  RIGHT ARM PAIN

 

TECHNIQUE:  2 views of the humerus were acquired.  

 

COMPARISON:  None.

 

FINDINGS:  

 

Bones:  No fractures or dislocations.  No suspicious bony lesions.  

 

Soft tissues:  No suspicious soft tissue calcifications or masses.  

 

IMPRESSION:  

No visualized acute fracture or dislocation. However, occult injury cannot be excluded. Recommend marah
rt interval imaging follow-up in 7-10 days as clinically indicated for additional evaluation.

 

 

 

Reviewed by: Tamara Bermeo MD on 5/30/2024 1:12 PM PDT

Approved by: Tamara Bermeo MD on 5/30/2024 1:12 PM PDT

 

 

Station ID:  [id number]

## 2024-06-09 ENCOUNTER — HOSPITAL ENCOUNTER (OUTPATIENT)
Dept: HOSPITAL 76 - LAB.R | Age: 79
Discharge: HOME | End: 2024-06-09
Attending: REGISTERED NURSE
Payer: MEDICARE

## 2024-06-09 DIAGNOSIS — I10: Primary | ICD-10-CM

## 2024-06-09 LAB
ANION GAP SERPL CALCULATED.4IONS-SCNC: 6 MMOL/L (ref 6–13)
BUN SERPL-MCNC: 20 MG/DL (ref 6–20)
CALCIUM UR-MCNC: 9.1 MG/DL (ref 8.5–10.3)
CHLORIDE SERPL-SCNC: 104 MMOL/L (ref 101–111)
CO2 SERPL-SCNC: 30 MMOL/L (ref 21–32)
CREAT SERPLBLD-SCNC: 0.7 MG/DL (ref 0.6–1.3)
GFRSERPLBLD MDRD-ARVRAT: 109 ML/MIN/{1.73_M2} (ref 89–?)
GLUCOSE SERPL-MCNC: 106 MG/DL (ref 74–104)
POTASSIUM SERPL-SCNC: 4 MMOL/L (ref 3.5–4.5)
SODIUM SERPLBLD-SCNC: 140 MMOL/L (ref 135–145)

## 2024-06-09 PROCEDURE — 36415 COLL VENOUS BLD VENIPUNCTURE: CPT

## 2024-06-09 PROCEDURE — 80048 BASIC METABOLIC PNL TOTAL CA: CPT

## 2024-07-25 ENCOUNTER — HOSPITAL ENCOUNTER (OUTPATIENT)
Dept: HOSPITAL 76 - EMS | Age: 79
Discharge: HOME | End: 2024-07-25
Attending: EMERGENCY MEDICINE
Payer: MEDICARE

## 2024-07-25 ENCOUNTER — HOSPITAL ENCOUNTER (OUTPATIENT)
Dept: HOSPITAL 76 - EMS | Age: 79
Discharge: ANOTHER HOSPITAL | End: 2024-07-25
Payer: MEDICARE

## 2024-07-25 ENCOUNTER — HOSPITAL ENCOUNTER (EMERGENCY)
Dept: HOSPITAL 76 - ED | Age: 79
Discharge: HOME | End: 2024-07-25
Payer: MEDICARE

## 2024-07-25 VITALS — OXYGEN SATURATION: 99 % | SYSTOLIC BLOOD PRESSURE: 175 MMHG | DIASTOLIC BLOOD PRESSURE: 75 MMHG

## 2024-07-25 DIAGNOSIS — E78.00: ICD-10-CM

## 2024-07-25 DIAGNOSIS — Z66: ICD-10-CM

## 2024-07-25 DIAGNOSIS — R11.2: Primary | ICD-10-CM

## 2024-07-25 DIAGNOSIS — E11.9: ICD-10-CM

## 2024-07-25 DIAGNOSIS — Z89.512: ICD-10-CM

## 2024-07-25 DIAGNOSIS — I69.920: ICD-10-CM

## 2024-07-25 DIAGNOSIS — K21.9: ICD-10-CM

## 2024-07-25 DIAGNOSIS — R41.0: ICD-10-CM

## 2024-07-25 DIAGNOSIS — I10: ICD-10-CM

## 2024-07-25 DIAGNOSIS — R10.812: ICD-10-CM

## 2024-07-25 DIAGNOSIS — K92.0: Primary | ICD-10-CM

## 2024-07-25 DIAGNOSIS — R11.10: Primary | ICD-10-CM

## 2024-07-25 DIAGNOSIS — Z74.01: ICD-10-CM

## 2024-07-25 DIAGNOSIS — Z79.899: ICD-10-CM

## 2024-07-25 DIAGNOSIS — Z89.511: ICD-10-CM

## 2024-07-25 PROCEDURE — 99283 EMERGENCY DEPT VISIT LOW MDM: CPT

## 2024-07-25 RX ADMIN — ONDANSETRON STA MG: 4 TABLET, ORALLY DISINTEGRATING ORAL at 03:51

## 2024-07-25 NOTE — ED PHYSICIAN DOCUMENTATION
History of Present Illness





- Stated complaint


Stated Complaint: COFFEE GROUND EMESIS





- Chief complaint


Chief Complaint: Abd Pain





- History obtained from


History obtained from: EMS, Other (SAM Rodriguez at [facility])





- Additonal information


Additional information: 





79-year-old man, DNR/DNI with comfort measures only, bilateral amputee with 

history of CVA with severe aphasia, also with gerd and possible history of 

gastric ulcer, presents with vomiting episode at 9 PM last night and again at 

2:30 AM with "coffee-ground emesis" witnessed by RN staff at [facility].  Further 

history limited by patient nonverbal at baseline





PD PAST MEDICAL HISTORY





- Past Medical History


Past Medical History: Yes


Cardiovascular: Hypertension, High cholesterol


Respiratory: Pneumonia


Neuro: CVA


Endocrine/Autoimmune: Type 2 diabetes


GI: None


: None


HEENT: None


Psych: None


Musculoskeletal: Hemiplegia


Derm: None





- Past Surgical History


Past Surgical History: Yes


General: Appendectomy


Ortho: Other


HEENT: Tonsil/Adenoidectomy





- Present Medications


Home Medications: 


                                Ambulatory Orders











 Medication  Instructions  Recorded  Confirmed


 


LORazepam [Lorazepam] 0.5 mg PO QPM PRN 02/05/16 07/17/22


 


Furosemide [Lasix] 20 mg PO DAILY 12/14/16 07/17/22


 


Potassium Chloride 20 meq PO DAILY 12/14/16 07/17/22


 


Baclofen [Lioresal] 10 mg PO TID 07/17/22 07/17/22


 


DULoxetine [Cymbalta] 30 mg PO DAILY 07/17/22 07/17/22


 


Docusate Sodium [Dulcolax Stool 100 mg PO BID 07/17/22 07/17/22





Softener]   


 


Ibuprofen [Motrin] 600 mg PO TID 07/17/22 07/17/22


 


Latanoprost 0.005% Ophth Drops 1 drops EACHEYE QPM 07/17/22 07/17/22





[Xalatan Ophth Drops]   


 


Loratadine [Claritin] 10 mg PO QPM 07/17/22 07/17/22


 


Methadone HCl 10 mg PO TID 07/17/22 07/17/22


 


Metoprolol Succinate [Toprol Xl] 25 mg PO DAILY 07/17/22 07/17/22


 


Pregabalin [Lyrica] 100 mg PO BID 07/17/22 07/17/22


 


Aspirin EC [Ecotrin] 81 mg PO DAILY #30 tablet 07/18/22 


 


Zinc Oxide 20% Oint [Zinc Oxide] 1 applic TOP PRN PRN 07/18/22 














- Allergies


Allergies/Adverse Reactions: 


                                    Allergies











Allergy/AdvReac Type Severity Reaction Status Date / Time


 


gabapentin Allergy  Unknown Verified 07/16/22 10:26














- Social History


Does the pt smoke?: No


Smoking Status: Never smoker


Does the pt drink ETOH?: No


Does the pt have substance abuse?: No





- Immunizations


Immunizations are current?: Yes





- POLST


Patient has POLST: No


POLST Status: DNR





PD ED PE NORMAL





- Vitals


Vital signs reviewed: Yes





- General


General: No acute distress, Well developed/nourished, Other (alert, lying in 

bed, nonverbal at baseline)





- HEENT


HEENT: Atraumatic, PERRL, EOMI





- Neck


Neck: Supple, no meningeal sign





- Cardiac


Cardiac: RRR





- Respiratory


Respiratory: No respiratory distress, Clear bilaterally





- Abdomen


Abdomen: Non tender, Non distended, No organomegaly





- Back


Back: No CVA TTP





- Derm


Derm: Normal color, Warm and dry





- Neuro


Neuro: No motor deficit, No sensory deficit


Eye Opening: Spontaneous


Motor: Obeys Commands


Verbal: None


GCS Score: 11





Results





- Vitals


Vitals: 





                               Vital Signs - 24 hr











  07/25/24 07/25/24





  03:24 03:34


 


Temperature 36.6 C 


 


Heart Rate 78 76


 


Respiratory 20 18





Rate  


 


Blood Pressure 165/87 H 156/84 H


 


O2 Saturation 95 95








                                     Oxygen











O2 Source                      Room air

















PD Medical Decision Making





- ED course


ED course: 





Patient was sent into the ED for further investigation however upon review of 

his POLST form it was determined that it is not within the patient's wishes to 

have extensive workup for medical conditions but rather to have comfort oriented

care.  This was discussed with and confirmed with [facility] staff and therefore 

antinausea medication was ordered but no further blood draws or testing was done

per patient wishes. Sent home with prepack of zofran for [facility] staff to 

administer as needed. patient does not appear to have any pain therefore holding

off on pain meds. plan to f/u pcp for referral to palliative care.





Departure





- Departure


Disposition: 01 Home, Self Care


Clinical Impression: 


 Vomiting





Condition: Stable


Instructions:  ED Nausea Vomiting


Comments: 


You were seen in the emergency department for vomiting. Please plan to follow up

with palliative care services in the morning. Given the POLST form with 

documentation of wishes for comfort measures only, we are providing zofran 

dissolving tablets and recommend close follow up with your doctor and with 

palliative care.





Please follow-up with your primary care provider and return to the emergency 

department if you have any new or worsening symptoms or other concerns.

## 2024-07-26 ENCOUNTER — HOSPITAL ENCOUNTER (OUTPATIENT)
Dept: HOSPITAL 76 - LAB.R | Age: 79
Discharge: HOME | End: 2024-07-26
Attending: SKILLED NURSING FACILITY
Payer: MEDICARE

## 2024-07-26 DIAGNOSIS — F03.90: Primary | ICD-10-CM

## 2024-07-26 LAB
ANION GAP SERPL CALCULATED.4IONS-SCNC: 9 MMOL/L (ref 6–13)
BUN SERPL-MCNC: 13 MG/DL (ref 6–20)
CALCIUM UR-MCNC: 9.2 MG/DL (ref 8.5–10.3)
CHLORIDE SERPL-SCNC: 103 MMOL/L (ref 101–111)
CO2 SERPL-SCNC: 28 MMOL/L (ref 21–32)
CREAT SERPLBLD-SCNC: 0.6 MG/DL (ref 0.6–1.3)
GFRSERPLBLD MDRD-ARVRAT: 131 ML/MIN/{1.73_M2} (ref 89–?)
GLUCOSE SERPL-MCNC: 93 MG/DL (ref 74–104)
POTASSIUM SERPL-SCNC: 3.5 MMOL/L (ref 3.5–4.5)
SODIUM SERPLBLD-SCNC: 140 MMOL/L (ref 135–145)

## 2024-07-26 PROCEDURE — 80048 BASIC METABOLIC PNL TOTAL CA: CPT

## 2024-09-16 ENCOUNTER — HOSPITAL ENCOUNTER (OUTPATIENT)
Dept: HOSPITAL 76 - LAB.R | Age: 79
Discharge: HOME | End: 2024-09-16
Attending: FAMILY MEDICINE
Payer: MEDICARE

## 2024-09-16 DIAGNOSIS — I10: Primary | ICD-10-CM

## 2024-09-16 DIAGNOSIS — I48.20: ICD-10-CM

## 2024-09-16 LAB
ALBUMIN DIAFP-MCNC: 3.3 G/DL (ref 3.2–5.5)
ALBUMIN/GLOB SERPL: 1.4 {RATIO} (ref 1–2.2)
ALP SERPL-CCNC: 51 IU/L (ref 42–121)
ALT SERPL W P-5'-P-CCNC: 6 IU/L (ref 10–60)
ANION GAP SERPL CALCULATED.4IONS-SCNC: 4 MMOL/L (ref 6–13)
AST SERPL W P-5'-P-CCNC: 8 IU/L (ref 10–42)
BILIRUB BLD-MCNC: 0.4 MG/DL (ref 0.2–1)
BUN SERPL-MCNC: 25 MG/DL (ref 6–20)
CALCIUM UR-MCNC: 8.6 MG/DL (ref 8.5–10.3)
CHLORIDE SERPL-SCNC: 103 MMOL/L (ref 101–111)
CO2 SERPL-SCNC: 32 MMOL/L (ref 21–32)
CREAT SERPLBLD-SCNC: 0.7 MG/DL (ref 0.6–1.3)
GFRSERPLBLD MDRD-ARVRAT: 109 ML/MIN/{1.73_M2} (ref 89–?)
GLOBULIN SER-MCNC: 2.4 G/DL (ref 2.1–4.2)
GLUCOSE SERPL-MCNC: 115 MG/DL (ref 74–104)
POTASSIUM SERPL-SCNC: 4.2 MMOL/L (ref 3.5–4.5)
PROT SPEC-MCNC: 5.7 G/DL (ref 6.4–8.9)
SODIUM SERPLBLD-SCNC: 139 MMOL/L (ref 135–145)

## 2024-09-16 PROCEDURE — 85025 COMPLETE CBC W/AUTO DIFF WBC: CPT

## 2024-09-16 PROCEDURE — 80053 COMPREHEN METABOLIC PANEL: CPT

## 2024-09-17 ENCOUNTER — HOSPITAL ENCOUNTER (OUTPATIENT)
Dept: HOSPITAL 76 - DI | Age: 79
Discharge: HOME | End: 2024-09-17
Attending: FAMILY MEDICINE
Payer: MEDICARE

## 2024-09-17 DIAGNOSIS — R06.02: Primary | ICD-10-CM

## 2024-09-17 NOTE — XRAY REPORT
PROCEDURE:  Chest 2V

 

INDICATIONS:  SHORTNESS OF BREATH

 

TECHNIQUE:  2 views of the chest were obtained.  

 

COMPARISON:  None.

 

FINDINGS:  

 

Surgical changes and devices:  None.  

 

Lungs and pleura:  Low lung volumes accentuate pulmonary interstitium and heart size. Atherosclerotic
 vascular calcification noted in the aortic arch.

 

Mediastinum:  Mediastinal contours appear normal.  Heart size is normal.

 

Bones and chest wall:  Generalized decreased osseous mineralization present. Old healed right-sided r
ib fractures

 

IMPRESSION:  

 

No acute cardiopulmonary findings. The lung volumes accentuates pulmonary interstitium

 

Reviewed by: Eduard Almaguer MD on 9/17/2024 3:05 PM [facility]

Approved by: Eduard Almaguer MD on 9/17/2024 3:05 PM [facility]

 

 

Station ID:  [id number]